# Patient Record
Sex: MALE | URBAN - METROPOLITAN AREA
[De-identification: names, ages, dates, MRNs, and addresses within clinical notes are randomized per-mention and may not be internally consistent; named-entity substitution may affect disease eponyms.]

---

## 2018-06-07 ENCOUNTER — HOSPITAL ENCOUNTER (OUTPATIENT)
Age: 63
Setting detail: SPECIMEN
Discharge: HOME | End: 2018-06-07
Payer: MEDICARE

## 2018-06-07 LAB
ABSOLUTE EOS #: 0.1 K/UL (ref 0–0.44)
ABSOLUTE IMMATURE GRANULOCYTE: <0.03 K/UL (ref 0–0.3)
ABSOLUTE LYMPH #: 2.58 K/UL (ref 1.1–3.7)
ABSOLUTE MONO #: 0.59 K/UL (ref 0.1–1.2)
ALBUMIN SERPL-MCNC: 4.2 G/DL (ref 3.5–5.2)
ALBUMIN/GLOBULIN RATIO: 1.5 (ref 1–2.5)
ALP BLD-CCNC: 77 U/L (ref 40–129)
ALT SERPL-CCNC: 13 U/L (ref 5–41)
ANION GAP SERPL CALCULATED.3IONS-SCNC: 16 MMOL/L (ref 9–17)
AST SERPL-CCNC: 17 U/L
BASOPHILS # BLD: 1 % (ref 0–2)
BASOPHILS ABSOLUTE: 0.05 K/UL (ref 0–0.2)
BILIRUB SERPL-MCNC: 0.64 MG/DL (ref 0.3–1.2)
BUN BLDV-MCNC: 11 MG/DL (ref 8–23)
BUN/CREAT BLD: NORMAL (ref 9–20)
CALCIUM SERPL-MCNC: 8.8 MG/DL (ref 8.6–10.4)
CHLORIDE BLD-SCNC: 105 MMOL/L (ref 98–107)
CHOLESTEROL, FASTING: 202 MG/DL
CHOLESTEROL/HDL RATIO: 4.3
CO2: 23 MMOL/L (ref 20–31)
CREAT SERPL-MCNC: 0.93 MG/DL (ref 0.7–1.2)
DIFFERENTIAL TYPE: NORMAL
EOSINOPHILS RELATIVE PERCENT: 1 % (ref 1–4)
GFR AFRICAN AMERICAN: >60 ML/MIN
GFR NON-AFRICAN AMERICAN: >60 ML/MIN
GFR SERPL CREATININE-BSD FRML MDRD: NORMAL ML/MIN/{1.73_M2}
GFR SERPL CREATININE-BSD FRML MDRD: NORMAL ML/MIN/{1.73_M2}
GLUCOSE FASTING: 87 MG/DL (ref 70–99)
HAV IGM SER IA-ACNC: NONREACTIVE
HCT VFR BLD CALC: 45.6 % (ref 40.7–50.3)
HDLC SERPL-MCNC: 47 MG/DL
HEMOGLOBIN: 15.1 G/DL (ref 13–17)
HEPATITIS B CORE IGM ANTIBODY: NONREACTIVE
HEPATITIS B SURFACE ANTIGEN: NONREACTIVE
HEPATITIS C ANTIBODY: NONREACTIVE
IMMATURE GRANULOCYTES: 0 %
LDL CHOLESTEROL: 120 MG/DL (ref 0–130)
LYMPHOCYTES # BLD: 30 % (ref 24–43)
MCH RBC QN AUTO: 32.7 PG (ref 25.2–33.5)
MCHC RBC AUTO-ENTMCNC: 33.1 G/DL (ref 28.4–34.8)
MCV RBC AUTO: 98.7 FL (ref 82.6–102.9)
MONOCYTES # BLD: 7 % (ref 3–12)
NRBC AUTOMATED: 0 PER 100 WBC
PDW BLD-RTO: 12.3 % (ref 11.8–14.4)
PLATELET # BLD: 221 K/UL (ref 138–453)
PLATELET ESTIMATE: NORMAL
PMV BLD AUTO: 10 FL (ref 8.1–13.5)
POTASSIUM SERPL-SCNC: 4.5 MMOL/L (ref 3.7–5.3)
PROSTATE SPECIFIC ANTIGEN: 1.04 UG/L
RBC # BLD: 4.62 M/UL (ref 4.21–5.77)
RBC # BLD: NORMAL 10*6/UL
SEG NEUTROPHILS: 61 % (ref 36–65)
SEGMENTED NEUTROPHILS ABSOLUTE COUNT: 5.24 K/UL (ref 1.5–8.1)
SODIUM BLD-SCNC: 144 MMOL/L (ref 135–144)
TOTAL PROTEIN: 7 G/DL (ref 6.4–8.3)
TRIGLYCERIDE, FASTING: 176 MG/DL
TSH SERPL DL<=0.05 MIU/L-ACNC: 6.12 MIU/L (ref 0.3–5)
VLDLC SERPL CALC-MCNC: ABNORMAL MG/DL (ref 1–30)
WBC # BLD: 8.6 K/UL (ref 3.5–11.3)
WBC # BLD: NORMAL 10*3/UL

## 2023-02-22 ENCOUNTER — APPOINTMENT (OUTPATIENT)
Dept: CT IMAGING | Age: 68
End: 2023-02-22
Payer: MEDICARE

## 2023-02-22 ENCOUNTER — HOSPITAL ENCOUNTER (INPATIENT)
Age: 68
LOS: 2 days | Discharge: HOME | End: 2023-02-24
Attending: EMERGENCY MEDICINE | Admitting: INTERNAL MEDICINE
Payer: MEDICARE

## 2023-02-22 ENCOUNTER — APPOINTMENT (OUTPATIENT)
Dept: GENERAL RADIOLOGY | Age: 68
End: 2023-02-22
Payer: MEDICARE

## 2023-02-22 DIAGNOSIS — R07.9 CHEST PAIN, UNSPECIFIED TYPE: Primary | ICD-10-CM

## 2023-02-22 DIAGNOSIS — R06.02 SHORTNESS OF BREATH: ICD-10-CM

## 2023-02-22 DIAGNOSIS — R09.02 HYPOXIA: ICD-10-CM

## 2023-02-22 PROBLEM — J44.1 COPD EXACERBATION (HCC): Status: ACTIVE | Noted: 2023-02-22

## 2023-02-22 LAB
ABSOLUTE EOS #: <0.03 K/UL (ref 0–0.44)
ABSOLUTE IMMATURE GRANULOCYTE: <0.03 K/UL (ref 0–0.3)
ABSOLUTE LYMPH #: 1.53 K/UL (ref 1.1–3.7)
ABSOLUTE MONO #: 0.41 K/UL (ref 0.1–1.2)
ALBUMIN SERPL-MCNC: 4.4 G/DL (ref 3.5–5.2)
ALBUMIN/GLOBULIN RATIO: 1.4 (ref 1–2.5)
ALP SERPL-CCNC: 88 U/L (ref 40–129)
ALT SERPL-CCNC: 10 U/L (ref 5–41)
ANION GAP SERPL CALCULATED.3IONS-SCNC: 15 MMOL/L (ref 9–17)
AST SERPL-CCNC: 15 U/L
BASOPHILS # BLD: 0 % (ref 0–2)
BASOPHILS ABSOLUTE: 0.03 K/UL (ref 0–0.2)
BILIRUB SERPL-MCNC: 0.9 MG/DL (ref 0.3–1.2)
BNP SERPL-MCNC: 330 PG/ML
BUN SERPL-MCNC: 11 MG/DL (ref 8–23)
CALCIUM SERPL-MCNC: 9.8 MG/DL (ref 8.6–10.4)
CHLORIDE SERPL-SCNC: 101 MMOL/L (ref 98–107)
CO2 SERPL-SCNC: 20 MMOL/L (ref 20–31)
CREAT SERPL-MCNC: 0.96 MG/DL (ref 0.7–1.2)
EOSINOPHILS RELATIVE PERCENT: 0 % (ref 1–4)
FLUAV AG SPEC QL: NEGATIVE
FLUBV AG SPEC QL: NEGATIVE
GFR SERPL CREATININE-BSD FRML MDRD: >60 ML/MIN/1.73M2
GLUCOSE SERPL-MCNC: 123 MG/DL (ref 70–99)
HCT VFR BLD AUTO: 43.5 % (ref 40.7–50.3)
HGB BLD-MCNC: 15.5 G/DL (ref 13–17)
IMMATURE GRANULOCYTES: 0 %
LYMPHOCYTES # BLD: 16 % (ref 24–43)
MCH RBC QN AUTO: 33.8 PG (ref 25.2–33.5)
MCHC RBC AUTO-ENTMCNC: 35.6 G/DL (ref 28.4–34.8)
MCV RBC AUTO: 94.8 FL (ref 82.6–102.9)
MONOCYTES # BLD: 4 % (ref 3–12)
NRBC AUTOMATED: 0 PER 100 WBC
PDW BLD-RTO: 12.4 % (ref 11.8–14.4)
PLATELET # BLD AUTO: 236 K/UL (ref 138–453)
PMV BLD AUTO: 9.1 FL (ref 8.1–13.5)
POTASSIUM SERPL-SCNC: 4.1 MMOL/L (ref 3.7–5.3)
PROT SERPL-MCNC: 7.5 G/DL (ref 6.4–8.3)
RBC # BLD: 4.59 M/UL (ref 4.21–5.77)
SARS-COV-2 RDRP RESP QL NAA+PROBE: NOT DETECTED
SEG NEUTROPHILS: 80 % (ref 36–65)
SEGMENTED NEUTROPHILS ABSOLUTE COUNT: 7.49 K/UL (ref 1.5–8.1)
SODIUM SERPL-SCNC: 136 MMOL/L (ref 135–144)
SPECIMEN DESCRIPTION: NORMAL
TROPONIN I SERPL DL<=0.01 NG/ML-MCNC: 11 NG/L (ref 0–22)
TROPONIN I SERPL DL<=0.01 NG/ML-MCNC: 12 NG/L (ref 0–22)
WBC # BLD AUTO: 9.5 K/UL (ref 3.5–11.3)

## 2023-02-22 PROCEDURE — 84484 ASSAY OF TROPONIN QUANT: CPT

## 2023-02-22 PROCEDURE — 85025 COMPLETE CBC W/AUTO DIFF WBC: CPT

## 2023-02-22 PROCEDURE — 6360000002 HC RX W HCPCS: Performed by: HEALTH CARE PROVIDER

## 2023-02-22 PROCEDURE — 99285 EMERGENCY DEPT VISIT HI MDM: CPT

## 2023-02-22 PROCEDURE — 71260 CT THORAX DX C+: CPT | Performed by: HEALTH CARE PROVIDER

## 2023-02-22 PROCEDURE — 80053 COMPREHEN METABOLIC PANEL: CPT

## 2023-02-22 PROCEDURE — 93005 ELECTROCARDIOGRAM TRACING: CPT | Performed by: HEALTH CARE PROVIDER

## 2023-02-22 PROCEDURE — 87804 INFLUENZA ASSAY W/OPTIC: CPT

## 2023-02-22 PROCEDURE — 87635 SARS-COV-2 COVID-19 AMP PRB: CPT

## 2023-02-22 PROCEDURE — 71046 X-RAY EXAM CHEST 2 VIEWS: CPT

## 2023-02-22 PROCEDURE — 1200000000 HC SEMI PRIVATE

## 2023-02-22 PROCEDURE — 83880 ASSAY OF NATRIURETIC PEPTIDE: CPT

## 2023-02-22 PROCEDURE — 6360000004 HC RX CONTRAST MEDICATION: Performed by: HEALTH CARE PROVIDER

## 2023-02-22 RX ORDER — ONDANSETRON 4 MG/1
4 TABLET, ORALLY DISINTEGRATING ORAL EVERY 8 HOURS PRN
Status: DISCONTINUED | OUTPATIENT
Start: 2023-02-22 | End: 2023-02-24 | Stop reason: HOSPADM

## 2023-02-22 RX ORDER — IPRATROPIUM BROMIDE AND ALBUTEROL SULFATE 2.5; .5 MG/3ML; MG/3ML
1 SOLUTION RESPIRATORY (INHALATION)
Status: DISCONTINUED | OUTPATIENT
Start: 2023-02-23 | End: 2023-02-23

## 2023-02-22 RX ORDER — SODIUM CHLORIDE 9 MG/ML
INJECTION, SOLUTION INTRAVENOUS CONTINUOUS
Status: DISCONTINUED | OUTPATIENT
Start: 2023-02-23 | End: 2023-02-24 | Stop reason: HOSPADM

## 2023-02-22 RX ORDER — ENOXAPARIN SODIUM 100 MG/ML
40 INJECTION SUBCUTANEOUS DAILY
Status: DISCONTINUED | OUTPATIENT
Start: 2023-02-23 | End: 2023-02-24 | Stop reason: HOSPADM

## 2023-02-22 RX ORDER — ACETAMINOPHEN 325 MG/1
650 TABLET ORAL EVERY 6 HOURS PRN
Status: DISCONTINUED | OUTPATIENT
Start: 2023-02-22 | End: 2023-02-24 | Stop reason: HOSPADM

## 2023-02-22 RX ORDER — POLYETHYLENE GLYCOL 3350 17 G/17G
17 POWDER, FOR SOLUTION ORAL DAILY PRN
Status: DISCONTINUED | OUTPATIENT
Start: 2023-02-22 | End: 2023-02-24 | Stop reason: HOSPADM

## 2023-02-22 RX ORDER — ALBUTEROL SULFATE 2.5 MG/3ML
2.5 SOLUTION RESPIRATORY (INHALATION)
Status: DISCONTINUED | OUTPATIENT
Start: 2023-02-22 | End: 2023-02-23

## 2023-02-22 RX ORDER — SODIUM CHLORIDE 0.9 % (FLUSH) 0.9 %
5-40 SYRINGE (ML) INJECTION EVERY 12 HOURS SCHEDULED
Status: DISCONTINUED | OUTPATIENT
Start: 2023-02-23 | End: 2023-02-24 | Stop reason: HOSPADM

## 2023-02-22 RX ORDER — METHYLPREDNISOLONE SODIUM SUCCINATE 125 MG/2ML
125 INJECTION, POWDER, LYOPHILIZED, FOR SOLUTION INTRAMUSCULAR; INTRAVENOUS ONCE
Status: COMPLETED | OUTPATIENT
Start: 2023-02-22 | End: 2023-02-22

## 2023-02-22 RX ORDER — ACETAMINOPHEN 650 MG/1
650 SUPPOSITORY RECTAL EVERY 6 HOURS PRN
Status: DISCONTINUED | OUTPATIENT
Start: 2023-02-22 | End: 2023-02-24 | Stop reason: HOSPADM

## 2023-02-22 RX ORDER — SODIUM CHLORIDE 9 MG/ML
INJECTION, SOLUTION INTRAVENOUS PRN
Status: DISCONTINUED | OUTPATIENT
Start: 2023-02-22 | End: 2023-02-24 | Stop reason: HOSPADM

## 2023-02-22 RX ORDER — ONDANSETRON 2 MG/ML
4 INJECTION INTRAMUSCULAR; INTRAVENOUS EVERY 6 HOURS PRN
Status: DISCONTINUED | OUTPATIENT
Start: 2023-02-22 | End: 2023-02-24 | Stop reason: HOSPADM

## 2023-02-22 RX ORDER — PREDNISONE 20 MG/1
40 TABLET ORAL DAILY
Status: DISCONTINUED | OUTPATIENT
Start: 2023-02-25 | End: 2023-02-23

## 2023-02-22 RX ORDER — METHYLPREDNISOLONE SODIUM SUCCINATE 40 MG/ML
40 INJECTION, POWDER, LYOPHILIZED, FOR SOLUTION INTRAMUSCULAR; INTRAVENOUS EVERY 6 HOURS
Status: DISCONTINUED | OUTPATIENT
Start: 2023-02-22 | End: 2023-02-23

## 2023-02-22 RX ORDER — SODIUM CHLORIDE 0.9 % (FLUSH) 0.9 %
5-40 SYRINGE (ML) INJECTION PRN
Status: DISCONTINUED | OUTPATIENT
Start: 2023-02-22 | End: 2023-02-24 | Stop reason: HOSPADM

## 2023-02-22 RX ORDER — AZITHROMYCIN 250 MG/1
500 TABLET, FILM COATED ORAL DAILY
Status: DISCONTINUED | OUTPATIENT
Start: 2023-02-23 | End: 2023-02-24 | Stop reason: HOSPADM

## 2023-02-22 RX ADMIN — METHYLPREDNISOLONE SODIUM SUCCINATE 125 MG: 125 INJECTION, POWDER, FOR SOLUTION INTRAMUSCULAR; INTRAVENOUS at 22:08

## 2023-02-22 RX ADMIN — IOPAMIDOL 75 ML: 755 INJECTION, SOLUTION INTRAVENOUS at 22:56

## 2023-02-22 ASSESSMENT — PAIN SCALES - GENERAL: PAINLEVEL_OUTOF10: 6

## 2023-02-22 ASSESSMENT — PAIN - FUNCTIONAL ASSESSMENT: PAIN_FUNCTIONAL_ASSESSMENT: 0-10

## 2023-02-22 ASSESSMENT — PAIN DESCRIPTION - LOCATION: LOCATION: CHEST;ABDOMEN

## 2023-02-22 NOTE — ED NOTES
Pt oxygen saturation 89% on room air.  Placed on 2L nasal cannula, increased to 93%     Sydney Walker RN  02/22/23 2139

## 2023-02-22 NOTE — ED NOTES
Pt to ED for chest pain and SOB. Pt reports intermittent chest pain x1 day along with nausea, vomiting, and chills. States same symptoms on and off for the past few months. Denies any medical problems, does not take medications daily. Patient alert and oriented x4, talking in complete sentences. Respirations even and unlabored. Patient placed on continuous cardiac monitoring, BP cuff, and pulse ox. EKG obtained, blood work obtained.  Call light in reach, all needs met at this time     Khadijah Samano RN  02/22/23 2052

## 2023-02-22 NOTE — ED PROVIDER NOTES
[facility]MED [facility]  Emergency Department Encounter  Emergency Medicine Resident     Pt Ihor Gosselin  MRN: [id number]  Ktgfevin 1955  Date of evaluation: 2/22/23  PCP:  No primary care provider on file. Note Started: 6:32 PM EST      CHIEF COMPLAINT       Chief Complaint   Patient presents with    Chest Pain    Nausea & Vomiting    Chills       HISTORY OF PRESENT ILLNESS  (Location/Symptom, Timing/Onset, Context/Setting, Quality, Duration, Modifying Factors, Severity.)      Lissa To is a 78 y.o. male who presents with chest pain, shortness of breath, generalized malaise and chills. Symptoms started yesterday. Worsening today. Patient's son brought him to the lobby and patient had to sit down due to feeling fatigued. JOSE G ZIEGLER was called overhead by information . On assessment, patient was awake, alert and talking. Complaining of chest pain and shortness of breath. Patient was brought to the emergency department for further assessment. Patient states that he has been feeling unwell for the last couple days. Began to feel short of breath with a cough, as well as chest pain. Has had subjective chills, but no fevers. Single episode of nausea and vomiting. Patient is a 50+ pack year smoker. Nuys other medical history, but states he has not seen a physician in more than a decade.     PAST MEDICAL / SURGICAL / SOCIAL / FAMILY HISTORY     Denies past medical or surgical history    Social History     Socioeconomic History    Marital status:      Spouse name: Not on file    Number of children: Not on file    Years of education: Not on file    Highest education level: Not on file   Occupational History    Not on file   Tobacco Use    Smoking status: Every Day     Packs/day: 1.00     Years: 25.00     Pack years: 25.00     Types: Cigarettes    Smokeless tobacco: Not on file   Substance and Sexual Activity    Alcohol use: Not on file    Drug use: Yes     Types: Marijuana Laveda Winkelman)    Sexual activity: Not on file   Other Topics Concern    Not on file   Social History Narrative    Not on file     Social Determinants of Health     Financial Resource Strain: Not on file   Food Insecurity: Not on file   Transportation Needs: Not on file   Physical Activity: Not on file   Stress: Not on file   Social Connections: Not on file   Intimate Partner Violence: Not on file   Housing Stability: Not on file       History reviewed. No pertinent family history. Allergies:  Patient has no known allergies. Home Medications:  Prior to Admission medications    Medication Sig Start Date End Date Taking? Authorizing Provider   aspirin 81 MG EC tablet Take 81 mg by mouth daily   Yes Historical Provider, MD         REVIEW OF SYSTEMS       Review of Systems   Constitutional:  Positive for chills and fatigue. Negative for diaphoresis and fever. HENT:  Negative for trouble swallowing. Eyes:  Negative for visual disturbance. Respiratory:  Positive for shortness of breath. Cardiovascular:  Positive for chest pain. Gastrointestinal:  Positive for nausea and vomiting. Negative for abdominal pain and diarrhea. Genitourinary:  Negative for decreased urine volume. Musculoskeletal:  Negative for neck pain and neck stiffness. Allergic/Immunologic: Negative for immunocompromised state. Neurological:  Negative for dizziness, syncope, light-headedness and headaches. Hematological:  Does not bruise/bleed easily. PHYSICAL EXAM      INITIAL VITALS:   BP (!) 145/78   Pulse 96   Temp 98.4 °F (36.9 °C) (Oral)   Resp 20   Ht 6' (1.829 m)   Wt 190 lb (86.2 kg)   SpO2 94%   BMI 25.77 kg/m²     Physical Exam  Vitals reviewed. Constitutional:       General: He is not in acute distress. Appearance: Normal appearance. He is ill-appearing. HENT:      Head: Normocephalic and atraumatic.       Right Ear: Tympanic membrane, ear canal and external ear normal.      Left Ear: Tympanic membrane, ear canal and external ear normal.      Nose: Nose normal. No rhinorrhea. Mouth/Throat:      Mouth: Mucous membranes are moist.      Pharynx: Oropharynx is clear. Eyes:      Conjunctiva/sclera: Conjunctivae normal.      Pupils: Pupils are equal, round, and reactive to light. Cardiovascular:      Rate and Rhythm: Normal rate and regular rhythm. Pulses: Normal pulses. Heart sounds: Normal heart sounds. Pulmonary:      Effort: Pulmonary effort is normal.      Breath sounds: Wheezing present. Comments: Scant wheezes bilaterally  Abdominal:      General: There is no distension. Palpations: Abdomen is soft. Tenderness: There is no abdominal tenderness. Musculoskeletal:      Cervical back: Normal range of motion and neck supple. Right lower leg: No edema. Left lower leg: No edema. Skin:     General: Skin is warm and dry. Capillary Refill: Capillary refill takes less than 2 seconds. Neurological:      General: No focal deficit present. Mental Status: He is alert and oriented to person, place, and time. Psychiatric:         Mood and Affect: Mood normal.         Behavior: Behavior normal.         DDX/DIAGNOSTIC RESULTS / EMERGENCY DEPARTMENT COURSE / MDM     Medical Decision Making  69-year-old male with chest pain, shortness of breath and generalized malaise. Given risk factors and dyspnea, will initiate cardiac work-up. We will also obtain COVID and flu swabs. Patient did desaturate to the high 80s on room air, so was placed on supplemental oxygen. Does report some improvement in breathing. Given smoking history and dyspnea with wheezing and hypoxia, patient may also have undiagnosed COPD. Does also not meet PERC criteria. Will consider D-dimer versus CTA of the chest based on additional exam findings. Amount and/or Complexity of Data Reviewed  Labs: ordered. Radiology: ordered. ECG/medicine tests: ordered.     Risk  Prescription drug management. Decision regarding hospitalization. EKG    Rhythm: normal sinus   Rate: normal/97  Axis: Right 147 degrees  Ectopy: Sinus arrhythmia  Conduction: normal  ST Segments: no acute change  T Waves: no acute change  Q Waves: none    Clinical Impression: Abnormal EKG  All EKG's are interpreted by the Emergency Department Physician who either signs or Co-signs this chart in the absence of a cardiologist.    EMERGENCY DEPARTMENT COURSE:      ED Course as of 02/23/23 0038   Wed Feb 22, 2023 2012 Patient desaturated into the 80s on room air. Now saturating 94% on 2 L. Heart score is 5. Will admit for further work-up. [GG]      ED Course User Index  [GG] Anai Goldstein MD       PROCEDURES:  None    CONSULTS:  IP CONSULT TO HOSPITALIST    CRITICAL CARE:  There was significant risk of life threatening deterioration of patient's condition requiring my direct management. Critical care time 33 minutes, excluding any documented procedures. FINAL IMPRESSION      1. Chest pain, unspecified type    2. Shortness of breath    3. Hypoxia          DISPOSITION / PLAN     DISPOSITION Admitted 02/22/2023 11:09:53 PM      PATIENT REFERRED TO:  No follow-up provider specified.     DISCHARGE MEDICATIONS:  Current Discharge Medication List          Anai Goldstein MD  Emergency Medicine Resident    (Please note that portions of thisnote were completed with a voice recognition program.  Efforts were made to edit the dictations but occasionally words are mis-transcribed.)       Anai Goldstein MD  Resident  02/23/23 8453

## 2023-02-23 ENCOUNTER — APPOINTMENT (OUTPATIENT)
Dept: NUCLEAR MEDICINE | Age: 68
End: 2023-02-23
Payer: MEDICARE

## 2023-02-23 ENCOUNTER — APPOINTMENT (OUTPATIENT)
Dept: GENERAL RADIOLOGY | Age: 68
End: 2023-02-23
Payer: MEDICARE

## 2023-02-23 ENCOUNTER — APPOINTMENT (OUTPATIENT)
Dept: NON INVASIVE DIAGNOSTICS | Age: 68
End: 2023-02-23
Payer: MEDICARE

## 2023-02-23 PROBLEM — R07.9 CHEST PAIN IN ADULT: Status: ACTIVE | Noted: 2023-02-23

## 2023-02-23 PROBLEM — Z72.0 TOBACCO ABUSE: Status: ACTIVE | Noted: 2023-02-23

## 2023-02-23 PROBLEM — R11.0 NAUSEA: Status: ACTIVE | Noted: 2023-02-23

## 2023-02-23 PROBLEM — R10.13 EPIGASTRIC ABDOMINAL PAIN: Status: ACTIVE | Noted: 2023-02-23

## 2023-02-23 PROBLEM — R07.9 CHEST PAIN: Status: ACTIVE | Noted: 2023-02-23

## 2023-02-23 PROBLEM — R19.7 DIARRHEA: Status: ACTIVE | Noted: 2023-02-23

## 2023-02-23 LAB
ANION GAP SERPL CALCULATED.3IONS-SCNC: 12 MMOL/L (ref 9–17)
BILIRUBIN URINE: NEGATIVE
BUN SERPL-MCNC: 13 MG/DL (ref 8–23)
CALCIUM SERPL-MCNC: 9.1 MG/DL (ref 8.6–10.4)
CASTS UA: NORMAL /LPF (ref 0–8)
CEA SERPL-MCNC: 2.9 NG/ML
CHLORIDE SERPL-SCNC: 104 MMOL/L (ref 98–107)
CO2 SERPL-SCNC: 23 MMOL/L (ref 20–31)
COLOR: YELLOW
CREAT SERPL-MCNC: 1.02 MG/DL (ref 0.7–1.2)
CRP SERPL HS-MCNC: 6.9 MG/L (ref 0–5)
EKG ATRIAL RATE: 97 BPM
EKG P-R INTERVAL: 176 MS
EKG Q-T INTERVAL: 338 MS
EKG QRS DURATION: 80 MS
EKG QTC CALCULATION (BAZETT): 429 MS
EKG R AXIS: 147 DEGREES
EKG T AXIS: 119 DEGREES
EKG VENTRICULAR RATE: 97 BPM
EPITHELIAL CELLS UA: NORMAL /HPF (ref 0–5)
ERYTHROCYTE [SEDIMENTATION RATE] IN BLOOD BY WESTERGREN METHOD: 16 MM/HR (ref 0–20)
GFR SERPL CREATININE-BSD FRML MDRD: >60 ML/MIN/1.73M2
GLUCOSE SERPL-MCNC: 144 MG/DL (ref 70–99)
GLUCOSE UR STRIP.AUTO-MCNC: NEGATIVE MG/DL
KETONES UR STRIP.AUTO-MCNC: ABNORMAL MG/DL
LDLC SERPL-MCNC: 164 U/L (ref 135–225)
LEUKOCYTE ESTERASE UR QL STRIP.AUTO: NEGATIVE
LV EF: 66 %
LVEF MODALITY: NORMAL
NITRITE UR QL STRIP.AUTO: NEGATIVE
POTASSIUM SERPL-SCNC: 4.1 MMOL/L (ref 3.7–5.3)
PROT UR STRIP.AUTO-MCNC: 5.5 MG/DL (ref 5–8)
PROT UR STRIP.AUTO-MCNC: ABNORMAL MG/DL
RBC CLUMPS #/AREA URNS AUTO: NORMAL /HPF (ref 0–4)
REASON FOR REJECTION: NORMAL
SODIUM SERPL-SCNC: 139 MMOL/L (ref 135–144)
SPECIFIC GRAVITY UA: 1.07 (ref 1–1.03)
TSH SERPL-ACNC: 1.27 UIU/ML (ref 0.3–5)
TURBIDITY: CLEAR
URINE HGB: ABNORMAL
UROBILINOGEN, URINE: NORMAL
WBC UA: NORMAL /HPF (ref 0–5)
ZZ NTE CLEAN UP: ORDERED TEST: NORMAL
ZZ NTE WITH NAME CLEAN UP: SPECIMEN SOURCE: NORMAL

## 2023-02-23 PROCEDURE — 1200000000 HC SEMI PRIVATE

## 2023-02-23 PROCEDURE — 36415 COLL VENOUS BLD VENIPUNCTURE: CPT

## 2023-02-23 PROCEDURE — 94640 AIRWAY INHALATION TREATMENT: CPT

## 2023-02-23 PROCEDURE — 97161 PT EVAL LOW COMPLEX 20 MIN: CPT

## 2023-02-23 PROCEDURE — 97530 THERAPEUTIC ACTIVITIES: CPT

## 2023-02-23 PROCEDURE — 6370000000 HC RX 637 (ALT 250 FOR IP): Performed by: CLINICAL NURSE SPECIALIST

## 2023-02-23 PROCEDURE — 93017 CV STRESS TEST TRACING ONLY: CPT

## 2023-02-23 PROCEDURE — 6370000000 HC RX 637 (ALT 250 FOR IP): Performed by: NURSE PRACTITIONER

## 2023-02-23 PROCEDURE — 83615 LACTATE (LD) (LDH) ENZYME: CPT

## 2023-02-23 PROCEDURE — 93010 ELECTROCARDIOGRAM REPORT: CPT | Performed by: INTERNAL MEDICINE

## 2023-02-23 PROCEDURE — 3430000000 HC RX DIAGNOSTIC RADIOPHARMACEUTICAL: Performed by: INTERNAL MEDICINE

## 2023-02-23 PROCEDURE — 2T015 HOSPITALIST 2ND TOUCH: CPT | Performed by: INTERNAL MEDICINE

## 2023-02-23 PROCEDURE — A9500 TC99M SESTAMIBI: HCPCS | Performed by: INTERNAL MEDICINE

## 2023-02-23 PROCEDURE — 2580000003 HC RX 258: Performed by: CLINICAL NURSE SPECIALIST

## 2023-02-23 PROCEDURE — 82378 CARCINOEMBRYONIC ANTIGEN: CPT

## 2023-02-23 PROCEDURE — 2700000000 HC OXYGEN THERAPY PER DAY

## 2023-02-23 PROCEDURE — 86140 C-REACTIVE PROTEIN: CPT

## 2023-02-23 PROCEDURE — 71045 X-RAY EXAM CHEST 1 VIEW: CPT

## 2023-02-23 PROCEDURE — 85652 RBC SED RATE AUTOMATED: CPT

## 2023-02-23 PROCEDURE — 81001 URINALYSIS AUTO W/SCOPE: CPT

## 2023-02-23 PROCEDURE — 51798 US URINE CAPACITY MEASURE: CPT

## 2023-02-23 PROCEDURE — 6370000000 HC RX 637 (ALT 250 FOR IP): Performed by: INTERNAL MEDICINE

## 2023-02-23 PROCEDURE — 78452 HT MUSCLE IMAGE SPECT MULT: CPT

## 2023-02-23 PROCEDURE — 6360000002 HC RX W HCPCS: Performed by: CLINICAL NURSE SPECIALIST

## 2023-02-23 PROCEDURE — 80048 BASIC METABOLIC PNL TOTAL CA: CPT

## 2023-02-23 PROCEDURE — 99223 1ST HOSP IP/OBS HIGH 75: CPT | Performed by: INTERNAL MEDICINE

## 2023-02-23 PROCEDURE — 84443 ASSAY THYROID STIM HORMONE: CPT

## 2023-02-23 PROCEDURE — 2580000003 HC RX 258: Performed by: INTERNAL MEDICINE

## 2023-02-23 RX ORDER — METOPROLOL TARTRATE 5 MG/5ML
5 INJECTION INTRAVENOUS EVERY 5 MIN PRN
Status: DISCONTINUED | OUTPATIENT
Start: 2023-02-23 | End: 2023-02-23 | Stop reason: ALTCHOICE

## 2023-02-23 RX ORDER — SODIUM CHLORIDE 0.9 % (FLUSH) 0.9 %
10 SYRINGE (ML) INJECTION PRN
Status: DISCONTINUED | OUTPATIENT
Start: 2023-02-23 | End: 2023-02-24 | Stop reason: HOSPADM

## 2023-02-23 RX ORDER — CHOLECALCIFEROL (VITAMIN D3) 125 MCG
5 CAPSULE ORAL NIGHTLY PRN
Status: DISCONTINUED | OUTPATIENT
Start: 2023-02-23 | End: 2023-02-24 | Stop reason: HOSPADM

## 2023-02-23 RX ORDER — PREDNISONE 20 MG/1
40 TABLET ORAL DAILY
Status: DISCONTINUED | OUTPATIENT
Start: 2023-02-23 | End: 2023-02-24 | Stop reason: HOSPADM

## 2023-02-23 RX ORDER — SODIUM CHLORIDE 9 MG/ML
500 INJECTION, SOLUTION INTRAVENOUS CONTINUOUS PRN
Status: DISCONTINUED | OUTPATIENT
Start: 2023-02-23 | End: 2023-02-23 | Stop reason: ALTCHOICE

## 2023-02-23 RX ORDER — IPRATROPIUM BROMIDE AND ALBUTEROL SULFATE 2.5; .5 MG/3ML; MG/3ML
1 SOLUTION RESPIRATORY (INHALATION) EVERY 4 HOURS PRN
Status: DISCONTINUED | OUTPATIENT
Start: 2023-02-23 | End: 2023-02-24 | Stop reason: HOSPADM

## 2023-02-23 RX ORDER — AMINOPHYLLINE DIHYDRATE 25 MG/ML
50 INJECTION, SOLUTION INTRAVENOUS PRN
Status: DISCONTINUED | OUTPATIENT
Start: 2023-02-23 | End: 2023-02-23 | Stop reason: ALTCHOICE

## 2023-02-23 RX ORDER — NITROGLYCERIN 0.4 MG/1
0.4 TABLET SUBLINGUAL EVERY 5 MIN PRN
Status: ACTIVE | OUTPATIENT
Start: 2023-02-23 | End: 2023-02-23

## 2023-02-23 RX ORDER — TECHNETIUM TC-99M SESTAMIBI 1 MG/10ML
40 INJECTION INTRAVENOUS
Status: COMPLETED | OUTPATIENT
Start: 2023-02-23 | End: 2023-02-23

## 2023-02-23 RX ORDER — FAMOTIDINE 20 MG/1
20 TABLET, FILM COATED ORAL 2 TIMES DAILY
Status: DISCONTINUED | OUTPATIENT
Start: 2023-02-23 | End: 2023-02-24 | Stop reason: HOSPADM

## 2023-02-23 RX ORDER — ALBUTEROL SULFATE 90 UG/1
2 AEROSOL, METERED RESPIRATORY (INHALATION) PRN
Status: DISCONTINUED | OUTPATIENT
Start: 2023-02-23 | End: 2023-02-23 | Stop reason: ALTCHOICE

## 2023-02-23 RX ORDER — ATROPINE SULFATE 0.1 MG/ML
0.5 INJECTION INTRAVENOUS EVERY 5 MIN PRN
Status: DISCONTINUED | OUTPATIENT
Start: 2023-02-23 | End: 2023-02-23 | Stop reason: ALTCHOICE

## 2023-02-23 RX ORDER — TECHNETIUM TC-99M SESTAMIBI 1 MG/10ML
15.6 INJECTION INTRAVENOUS
Status: COMPLETED | OUTPATIENT
Start: 2023-02-23 | End: 2023-02-23

## 2023-02-23 RX ORDER — SODIUM CHLORIDE 0.9 % (FLUSH) 0.9 %
5-40 SYRINGE (ML) INJECTION PRN
Status: DISCONTINUED | OUTPATIENT
Start: 2023-02-23 | End: 2023-02-23 | Stop reason: ALTCHOICE

## 2023-02-23 RX ORDER — ASPIRIN 81 MG/1
81 TABLET ORAL DAILY
COMMUNITY

## 2023-02-23 RX ADMIN — SODIUM CHLORIDE: 9 INJECTION, SOLUTION INTRAVENOUS at 00:19

## 2023-02-23 RX ADMIN — SODIUM CHLORIDE: 9 INJECTION, SOLUTION INTRAVENOUS at 11:16

## 2023-02-23 RX ADMIN — ENOXAPARIN SODIUM 40 MG: 100 INJECTION SUBCUTANEOUS at 11:14

## 2023-02-23 RX ADMIN — Medication 5 MG: at 20:48

## 2023-02-23 RX ADMIN — METHYLPREDNISOLONE SODIUM SUCCINATE 40 MG: 40 INJECTION, POWDER, FOR SOLUTION INTRAMUSCULAR; INTRAVENOUS at 04:01

## 2023-02-23 RX ADMIN — FAMOTIDINE 20 MG: 20 TABLET, FILM COATED ORAL at 20:31

## 2023-02-23 RX ADMIN — IPRATROPIUM BROMIDE AND ALBUTEROL SULFATE 1 AMPULE: 2.5; .5 SOLUTION RESPIRATORY (INHALATION) at 07:47

## 2023-02-23 RX ADMIN — AZITHROMYCIN 500 MG: 250 TABLET, FILM COATED ORAL at 11:13

## 2023-02-23 RX ADMIN — FAMOTIDINE 20 MG: 20 TABLET, FILM COATED ORAL at 11:14

## 2023-02-23 RX ADMIN — TETRAKIS(2-METHOXYISOBUTYLISOCYANIDE)COPPER(I) TETRAFLUOROBORATE 15.6 MILLICURIE: 1 INJECTION, POWDER, LYOPHILIZED, FOR SOLUTION INTRAVENOUS at 07:45

## 2023-02-23 RX ADMIN — TETRAKIS(2-METHOXYISOBUTYLISOCYANIDE)COPPER(I) TETRAFLUOROBORATE 40 MILLICURIE: 1 INJECTION, POWDER, LYOPHILIZED, FOR SOLUTION INTRAVENOUS at 09:45

## 2023-02-23 RX ADMIN — SODIUM CHLORIDE, PRESERVATIVE FREE 10 ML: 5 INJECTION INTRAVENOUS at 09:45

## 2023-02-23 RX ADMIN — PREDNISONE 40 MG: 20 TABLET ORAL at 11:14

## 2023-02-23 RX ADMIN — SODIUM CHLORIDE, PRESERVATIVE FREE 10 ML: 5 INJECTION INTRAVENOUS at 07:45

## 2023-02-23 RX ADMIN — SODIUM CHLORIDE, PRESERVATIVE FREE 5 ML: 5 INJECTION INTRAVENOUS at 11:15

## 2023-02-23 ASSESSMENT — ENCOUNTER SYMPTOMS
NAUSEA: 1
TROUBLE SWALLOWING: 0
DIARRHEA: 0
ABDOMINAL PAIN: 0
VOMITING: 1
SHORTNESS OF BREATH: 1

## 2023-02-23 ASSESSMENT — PAIN - FUNCTIONAL ASSESSMENT
PAIN_FUNCTIONAL_ASSESSMENT: ACTIVITIES ARE NOT PREVENTED

## 2023-02-23 ASSESSMENT — PAIN DESCRIPTION - ORIENTATION
ORIENTATION: UPPER
ORIENTATION: MID;UPPER
ORIENTATION: LOWER;MID

## 2023-02-23 ASSESSMENT — PAIN DESCRIPTION - FREQUENCY
FREQUENCY: INTERMITTENT

## 2023-02-23 ASSESSMENT — PAIN DESCRIPTION - LOCATION
LOCATION: ABDOMEN;CHEST
LOCATION: ABDOMEN
LOCATION: OTHER (COMMENT)

## 2023-02-23 ASSESSMENT — PAIN DESCRIPTION - ONSET
ONSET: ON-GOING

## 2023-02-23 ASSESSMENT — PAIN SCALES - GENERAL
PAINLEVEL_OUTOF10: 2
PAINLEVEL_OUTOF10: 6
PAINLEVEL_OUTOF10: 3
PAINLEVEL_OUTOF10: 5

## 2023-02-23 ASSESSMENT — PAIN DESCRIPTION - PAIN TYPE
TYPE: ACUTE PAIN

## 2023-02-23 ASSESSMENT — PAIN DESCRIPTION - DESCRIPTORS
DESCRIPTORS: SQUEEZING
DESCRIPTORS: TIGHTNESS
DESCRIPTORS: SQUEEZING;TIGHTNESS

## 2023-02-23 NOTE — PLAN OF CARE
Problem: Pain  Goal: Verbalizes/displays adequate comfort level or baseline comfort level  Outcome: Progressing     Problem: ABCDS Injury Assessment  Goal: Absence of physical injury  Outcome: Progressing     Problem: Nutrition Deficit:  Goal: Optimize nutritional status  2/23/2023 1728 by Jenn Craven RN  Outcome: Progressing  2/23/2023 1511 by Maria C Bush RD  Flowsheets (Taken 2/23/2023 1511)  Nutrient intake appropriate for improving, restoring, or maintaining nutritional needs:   Assess nutritional status and recommend course of action   Recommend appropriate diets, oral nutritional supplements, and vitamin/mineral supplements   Monitor oral intake, labs, and treatment plans

## 2023-02-23 NOTE — ED NOTES
Report given to [facility], RN.  All questions answered     Sydney Walker RN  02/22/23 1910

## 2023-02-23 NOTE — PROGRESS NOTES
Physical Therapy        Physical Therapy Cancel Note      DATE: 2023    NAME: Senait Nichols  MRN: [id number]   : 1955      Patient not seen this date for Physical Therapy due to:    Testing: pt off the floor for stress test. PT will check back as time allows.       Electronically signed by Bonny Jacobson PT on 2023 at 1:46 PM

## 2023-02-23 NOTE — ED NOTES
Pt. Resting on stretcher,RR even and non-labored  Pt. Updated on POC.   Will continue to monitor      Daniel Hurtado RN  02/22/23 7610

## 2023-02-23 NOTE — PROGRESS NOTES
Comprehensive Nutrition Assessment    Type and Reason for Visit:  RD Nutrition Re-Screen/LOS    Nutrition Recommendations/Plan:   Continue diet as ordered. Encourage PO intakes. Continue to monitor weights, intakes, labs, and follow up. Malnutrition Assessment:  Malnutrition Status:  No malnutrition (02/23/23 1509)    Context:  Acute Illness     Findings of the 6 clinical characteristics of malnutrition:  Energy Intake:  50% or less of estimated energy requirements for 5 or more days  Weight Loss:  No significant weight loss (approximately 5% weight loss in three months)     Body Fat Loss:  No significant body fat loss     Muscle Mass Loss:  No significant muscle mass loss    Fluid Accumulation:  No significant fluid accumulation     Strength:  Not Performed    Nutrition Assessment:    77 y/o male, admitted related to COPD exacerbation, chest, pain, nausea/vomiting, abdominal pain. Patient NPO this morning to stress test, diet advanced to regular. Patient reported that he received a meal tray after test, consumed little amount due to lack of appetite, but did consume the sherbert. Observed patient meal preferences being obtained prior to visit. Patient also stated his son is bringing him in a BLT in approx 30 minutes. Patient stated he has had a poor appetite since Tuesday, and did not consume anything PO yesterday, but can tell his appetite is coming back and that he is starting to feel hungry. Patient reported a 5 to 10 lb weight loss this winter, but did not seem concerned. Stated he is active during the day, walking approximately 2000 steps outdoors every day. No BM documented, +BS. Recommend to cont with current plan of care. Nutrition Related Findings:    Meds: Zithromax, prednisone; Labs reviewed Wound Type: None       Current Nutrition Intake & Therapies:    Average Meal Intake: Unable to assess  Average Supplements Intake: None Ordered  ADULT DIET;  Regular    Anthropometric Measures:  Height: 6' (182.9 cm)  Ideal Body Weight (IBW): 178 lbs (81 kg)    Admission Body Weight: 190 lb (86.2 kg)  Current Body Weight: 190 lb (86.2 kg),   IBW. Weight Source: Bed Scale  Current BMI (kg/m2): 25.8  Usual Body Weight: 200 lb (90.7 kg)  % Weight Change (Calculated): -5  Weight Adjustment For: No Adjustment  BMI Categories: Overweight (BMI 25.0-29.9)    Estimated Daily Nutrient Needs:  Energy Requirements Based On: Kcal/kg  Weight Used for Energy Requirements: Current  Energy (kcal/day): 1700-2100kcal/day  Weight Used for Protein Requirements: Current  Protein (g/day): 110gmsPRO/day  Fluid (ml/day): Per MD    Nutrition Diagnosis:   Predicted inadequate energy intake related to impaired respiratory function as evidenced by poor intake prior to admission    Nutrition Interventions:   Food and/or Nutrient Delivery: Continue Current Diet  Nutrition Education/Counseling: No recommendation at this time  Coordination of Nutrition Care: Continue to monitor while inpatient  Plan of Care discussed with: Patient    Goals:  Previous Goal Met:  (Goal set)  Goals: Meet at least 75% of estimated needs, within 7 days, PO intake 50% or greater       Nutrition Monitoring and Evaluation:   Behavioral-Environmental Outcomes: None Identified  Food/Nutrient Intake Outcomes: Food and Nutrient Intake  Physical Signs/Symptoms Outcomes: Biochemical Data, Nutrition Focused Physical Findings, Weight, Fluid Status or Edema, GI Status    Discharge Planning:    Too soon to determine     Gena Alfonso RD  Contact: [phone]

## 2023-02-23 NOTE — CARE COORDINATION
Case Management Assessment  Initial Evaluation    Date/Time of Evaluation: 2/23/2023 4:24 PM  Assessment Completed by: Tiffany Brown RN    If patient is discharged prior to next notation, then this note serves as note for discharge by case management. Patient Name: Aldo Shook                   YOB: 1955  Diagnosis: Shortness of breath [R06.02]  Hypoxia [R09.02]  COPD exacerbation ([facility] [address] [J44.1]  Chest pain, unspecified type [R07.9]  Chest pain in adult [R07.9]                   Date / Time: 2/22/2023  6:28 PM    Patient Admission Status: Inpatient   Readmission Risk (Low < 19, Mod (19-27), High > 27): Readmission Risk Score: 5.4    Current PCP: No primary care provider on file. PCP verified by CM? (P)  (No PCP-dtr working on finding one for patient)    Chart Reviewed: Yes      History Provided by: (P) Patient  Patient Orientation: (P) Alert and Oriented, Person, Place, Situation    Patient Cognition: (P) Alert    Hospitalization in the last 30 days (Readmission):  No    If yes, Readmission Assessment in CM Navigator will be completed.     Advance Directives:      Code Status: Full Code   Patient's Primary Decision Maker is: (P) Legal Next of Kin      Discharge Planning:    Patient lives with: (P) Alone Type of Home: (P) Apartment  Primary Care Giver: (P) Self  Patient Support Systems include: (P) Children, Family Members, Friends/Neighbors   Current Financial resources: (P) Medicare  Current community resources:    Current services prior to admission: (P) None            Current DME:              Type of Home Care services:  (P) None    ADLS  Prior functional level: (P) Independent in ADLs/IADLs  Current functional level: (P) Independent in ADLs/IADLs    PT AM-PAC: 24 /24  OT AM-PAC:   /24    Family can provide assistance at DC: (P) Yes  Would you like Case Management to discuss the discharge plan with any other family members/significant others, and if so, who? (P) No  Plans to Return to Present Housing: (P) Yes  Other Identified Issues/Barriers to RETURNING to current housing: na  Potential Assistance needed at discharge: (P) N/A            Potential DME:    Patient expects to discharge to: (P) [facility] for transportation at discharge: (P) Family    Financial    Payor: Natty Dony / Plan: MEDICARE PART A AND B / Product Type: *No Product type* /     Does insurance require precert for SNF: No    Potential assistance Purchasing Medications:    Meds-to-Beds request: Yes      Howie  [address]  [address] [place] [address]  Phone: [phone] Fax: [phone]      Notes:    Factors facilitating achievement of predicted outcomes: Family support, Motivated, Cooperative, Pleasant, and Sense of humor    Barriers to discharge: Decreased endurance    Additional Case Management Notes: Spoke with patient at bedside, role explained. Plan to return home independently. Has transportation. Denies further needs. Address, telephone numbers, ER contacts and insurance reviewed. The Plan for Transition of Care is related to the following treatment goals of Shortness of breath [R06.02]  Hypoxia [R09.02]  COPD exacerbation (HCC) [J44.1]  Chest pain, unspecified type [R07.9]  Chest pain in adult [U82.7]    IF APPLICABLE: The Patient and/or patient representative Abhishek Ortiz and his family were provided with a choice of provider and agrees with the discharge plan. Freedom of choice list with basic dialogue that supports the patient's individualized plan of care/goals and shares the quality data associated with the providers was provided to: (P) Patient   Patient Representative Name:       The Patient and/or Patient Representative Agree with the Discharge Plan?       Mikal Hu RN  Case Management Department  Ph: [phone]

## 2023-02-23 NOTE — PROGRESS NOTES
Occupational [employer] [employer]  Occupational Therapy Not Seen Note    DATE: 2023    NAME: Mariella Nathan  MRN: [id number]   : 1955      Patient not seen this date for Occupational Therapy due to:    Testing: Pt off floor at [employer].      Electronically signed by Brian Morrell OT on 2023 at 12:15 PM

## 2023-02-23 NOTE — PROGRESS NOTES
SPIRITUAL CARE DEPARTMENT - [facility] [address]  PROGRESS NOTE    Shift date: 2.22.2023  Shift day: Wednesday   Shift # 2    Room # 30/30   Name: Cadence Wilkins                Spiritism: unknown   Place of Restorationism: unknown    Referral: Rapid Response / Code Blue    Admit Date & Time: 2/22/2023  6:28 PM    Assessment:  Cadence Wilkins is a 78 y.o. male in the hospital due to a rapid response/Code Blue. Upon arrival,  met with patient's son who appears to be concerned but coping. Son states frustration at the situation as he was trying to take his father to the ED. Patient appears to be calm and coping.  brought son bedside for support. Intervention:  Writer introduced self and title as  Writer offered space for the patient and son  to express feelings, needs, and concerns and provided a ministry presence. Outcome:  Patient and son appear to be calm and coping at this time. Plan:  Chaplains will remain available to offer spiritual and emotional support as needed. Electronically signed by Mehnaz Bowers on 2/22/2023 at 9:39 PM.  [facility]  [phone]       02/22/23 1828   Encounter Summary   Service Provided For: Patient; Family   Referral/Consult From: Multi-disciplinary team   Support System Children   Last Encounter  02/22/23   Complexity of Encounter Moderate   Begin Time 1828   End Time  1900   Total Time Calculated 32 min   Encounter    Type Initial Screen/Assessment   Crisis   Type Rapid Response   Assessment/Intervention/Outcome   Assessment Anxious; Coping   Intervention Active listening;Discussed illness injury and its impact; Explored/Affirmed feelings, thoughts, concerns   Outcome Engaged in conversation;Expressed feelings, needs, and concerns     Electronically signed by Jessica Weiss on 2/22/2023 at 9:39 PM

## 2023-02-23 NOTE — PLAN OF CARE
[facility]  Office: [address], DO, Sydnee Gardner, DO, Faina Richardson, DO, [facility], DO, Alicia Domingo MD, Chichi Barth MD, Bernadine Cassidy MD, Jude Whitlock MD,  Nehemiah Reyes MD, Anson Jean Baptiste MD, Cecilia Thorpe, DO, Whitney Irving MD,  Dylan Cuadra, DO, Mackenzie Chilel MD, Ekta Gandhi MD, Abdulkadir Whitaker, DO, Neri Soto MD, Rogelio Esteban MD, Tl Collins, DO, Lb Pérez MD, Frantz Swenson MD, Aleta Batista MD, Vladimir Pena MD, Ca Jeffries, DO, Martha Clark MD, Jose Carlos Robertson MD, Yady Shipman, Kurt Carreon, CNP, Idalia Dill, CNP, Priyank Orozco, CNP,  Burnett Osgood, DNP, Lizzeth Lehman, CNP, Marychuy Safe, CNP, Ashley Araiza, CNP, Abner Keen, CNP, Lela Torrez, CNP, Marisol Carreon PA-C, Cira Muñoz, CNS, Marguerite Alfaro, CNP, [facility], [address] [place] [address]    Second Visit Note  For more detailed information please refer to the progress note of the day      2/23/2023    1:32 PM    Name:   Evelia Sheriff  MRN:     [id number]     [place]:      [de-identified]   Room:   [address] [place] [address] Day:  1  Admit Date:  2/22/2023  6:28 PM    PCP:   No primary care provider on file.   Code Status:  Full Code      Pt vitals were reviewed   New labs were reviewed   Patient was seen    Updated plan :     Shortness of breath improved  For stress this AM  Counseled on smoking cessation  Discussed importance of establishing PCP on d/c  Discussed with daughter at bedside       Nehemiah Reyes MD  2/23/2023  1:32 PM

## 2023-02-23 NOTE — PROGRESS NOTES
Physical Therapy  Facility/Department: [facility] RENAL//MED SURG  Physical Therapy Initial Assessment    Name: Neida Khalil  : 1955  MRN: [id number]  Date of Service: 2023    Discharge Recommendations: No therapy recommended at discharge. Chief Complaint   Patient presents with    Chest Pain    Nausea & Vomiting    Chills      Neida Khalil is a 78 y.o.  male with history of tobacco abuse, COPD who presents with Chest Pain, Nausea & Vomiting, and Chills and is admitted to the hospital for the management of COPD exacerbation ([facility] [address] PT Equipment Recommendations  Equipment Needed: No      Patient Diagnosis(es): The primary encounter diagnosis was Chest pain, unspecified type. Diagnoses of Shortness of breath and Hypoxia were also pertinent to this visit. Past Medical History:  has no past medical history on file. Past Surgical History:  has no past surgical history on file. Assessment   Assessment: Pt ambulates 300 ft with no AD independently. Pt should be safe to return to prior living situation with intermittent support as needed. Pt educated on d/c PT, agreeable. Therapy Prognosis: Good  Decision Making: Low Complexity  Barriers to Learning: none  Requires PT Follow-Up: Yes  Activity Tolerance  Activity Tolerance: Patient tolerated treatment well     Plan   Physcial Therapy Plan  General Plan:  (d/c PT)  Safety Devices  Type of Devices: All fall risk precautions in place, Call light within reach, Left in bed, Gait belt     Restrictions  Restrictions/Precautions  Restrictions/Precautions: Up as Tolerated  Required Braces or Orthoses?: No  Position Activity Restriction  Other position/activity restrictions: Can increase activity as tolerated as long as SpO2 greater than 92%.      Subjective   General  Patient assessed for rehabilitation services?: Yes  Response To Previous Treatment: Not applicable  Family / Caregiver Present: No  Follows Commands: Within Functional Limits  Subjective  Subjective: RN and pt agreeable to PT. pt agreeable and pleasant. pt supine in bed at start of session, c/o 2/10 abdominal pain. Social/Functional History  Social/Functional History  Lives With: Alone  Type of Home: Apartment (2nd floor)  Home Layout: One level  Home Access: Stairs to enter with rails  Entrance Stairs - Number of Steps: 15-20  Entrance Stairs - Rails: Both  Bathroom Shower/Tub: Tub/Shower unit  Bathroom Toilet: Standard  Home Equipment:  (no DME at baseline)  Receives Help From: Family  ADL Assistance: Independent  Homemaking Assistance: Independent  Homemaking Responsibilities: Yes  Ambulation Assistance: Independent  Transfer Assistance: Independent  Active : No  Patient's  Info: public transportation or children  Occupation: Retired  Type of Occupation: manager at [employer] [address]: homebody, go downtown  Additional Comments: Pt reports that his neighbor, son, daughter, or grandchildren can provide PRN support as needed.   Vision/Hearing  Vision  Vision: Impaired  Vision Exceptions: Wears glasses for reading  Hearing  Hearing: Within functional limits    Cognition   Orientation  Overall Orientation Status: Within Functional Limits  Cognition  Overall Cognitive Status: WFL             Gross Assessment  Sensation: Intact (pt denies n/t)     AROM RLE (degrees)  RLE AROM: WFL  AROM LLE (degrees)  LLE AROM : WFL  AROM RUE (degrees)  RUE AROM : WFL  AROM LUE (degrees)  LUE AROM : WFL  Strength RLE  Strength RLE: WFL  Strength LLE  Strength LLE: WFL  Strength RUE  Strength RUE: WFL  Strength LUE  Strength LUE: WFL           Bed mobility  Supine to Sit: Modified independent  Sit to Supine: Modified independent  Scooting: Modified independent  Bed Mobility Comments: HOB elevated  Transfers  Sit to Stand: Independent  Stand to Sit: Independent  Comment: no AD used  Ambulation  Surface: Level tile  Device: No Device  Other Apparatus: O2 (2 L per NC)  Assistance: Independent  Gait Deviations: Slow Yoko  Distance: 300 ft  Comments: No LOB noted.   More Ambulation?: No  Stairs/Curb  Stairs?: No     Balance  Posture: Good  Sitting - Static: Good  Sitting - Dynamic: Good  Standing - Static: Good  Standing - Dynamic: Good  Comments: No AD used                                                           AM-PAC Score  AM-PAC Inpatient Mobility Raw Score : 24 (02/23/23 1549)  AM-PAC Inpatient T-Scale Score : 61.14 (02/23/23 1549)  Mobility Inpatient CMS 0-100% Score: 0 (02/23/23 1549)  Mobility Inpatient CMS G-Code Modifier : [address] (02/23/23 1549)             Goals  Short Term Goals  Time Frame for Short Term Goals: d/c PT       Education  Patient Education  Education Given To: Patient  Education Provided: Role of Therapy;Plan of Care  Education Method: Demonstration;Verbal  Barriers to Learning: None  Education Outcome: Verbalized understanding;Demonstrated understanding      Therapy Time   Individual Concurrent Group Co-treatment   Time In 1408         Time Out 1426         Minutes 18         Timed Code Treatment Minutes: [address] [facility], PT

## 2023-02-23 NOTE — RT PROTOCOL NOTE
RT Inhaler-Nebulizer Bronchodilator Protocol Note    There is a bronchodilator order in the chart from a provider indicating to follow the RT Bronchodilator Protocol and there is an Initiate RT Inhaler-Nebulizer Bronchodilator Protocol order as well (see protocol at bottom of note). CXR Findings: no acute process    The findings from the last RT Protocol Assessment were as follows:   History Pulmonary Disease:  no hx  Respiratory Pattern: unlabored  Breath Sounds:  clear , dim in bases  Cough:  none  Indication for Bronchodilator Therapy:  sob  Bronchodilator Assessment Score: 3     Aerosolized bronchodilator medication orders have been revised according to the RT Inhaler-Nebulizer Bronchodilator Protocol below. Respiratory Therapist to perform RT Therapy Protocol Assessment initially then follow the protocol. Repeat RT Therapy Protocol Assessment PRN for score 0-3 or on second treatment, BID, and PRN for scores above 3. No Indications - adjust the frequency to every 6 hours PRN wheezing or bronchospasm, if no treatments needed after 48 hours then discontinue using Per Protocol order mode. If indication present, adjust the RT bronchodilator orders based on the Bronchodilator Assessment Score as indicated below. Use Inhaler orders unless patient has one or more of the following: on home nebulizer, not able to hold breath for 10 seconds, is not alert and oriented, cannot activate and use MDI correctly, or respiratory rate 25 breaths per minute or more, then use the equivalent nebulizer order(s) with same Frequency and PRN reasons based on the score. If a patient is on this medication at home then do not decrease Frequency below that used at home. 0-3 - enter or revise RT bronchodilator order(s) to equivalent RT Bronchodilator order with Frequency of every 4 hours PRN for wheezing or increased work of breathing using Per Protocol order mode.         4-6 - enter or revise RT Bronchodilator order(s) to two equivalent RT bronchodilator orders with one order with BID Frequency and one order with Frequency of every 4 hours PRN wheezing or increased work of breathing using Per Protocol order mode. 7-10 - enter or revise RT Bronchodilator order(s) to two equivalent RT bronchodilator orders with one order with TID Frequency and one order with Frequency of every 4 hours PRN wheezing or increased work of breathing using Per Protocol order mode. 11-13 - enter or revise RT Bronchodilator order(s) to one equivalent RT bronchodilator order with QID Frequency and an Albuterol order with Frequency of every 4 hours PRN wheezing or increased work of breathing using Per Protocol order mode. Greater than 13 - enter or revise RT Bronchodilator order(s) to one equivalent RT bronchodilator order with every 4 hours Frequency and an Albuterol order with Frequency of every 2 hours PRN wheezing or increased work of breathing using Per Protocol order mode. RT to enter RT Home Evaluation for COPD & MDI Assessment order using Per Protocol order mode.     Electronically signed by Kamari Sahu RCP on 2/23/2023 at 7:53 AM

## 2023-02-23 NOTE — PROCEDURES
[facility]                              CARDIAC STRESS TEST    PATIENT NAME: Rachel Khan                 :        1955  MED REC NO:   [id number]                             ROOM:       0319  ACCOUNT NO:   [de-identified]                           ADMIT DATE: 2023  PROVIDER:     Neymar Banuelos    DATE OF STUDY:  2023    ORDERING PROVIDER:  Vinnie Fuchs MD    PRIMARY CARE PROVIDER:  Rai Nuñez MD    INTERPRETING PHYSICIAN:  Neymar Banuelos MD    CARDIOLITE TREADMILL STRESS STUDY:    Procedure was explained and consent was given    Indication:  Chest pain, ECG abnormalities    Protocol:  Dav  Resting HR:  104 bpm  Resting BP:  154/77 mm/Hg  Maximum HR:  151 bpm, or 98% of age predicted maximum heart rate  Peak BP:  188/76 mm/Hg  Resting EKG:  Normal (normal sinus rhythm with PAC's)  Duration:  4 minutes and 57 seconds  METS:  6.0  Reason for termination:  Reached target heart rate  Stress heart response:  Normal  Stress BP response:  Hypertensive  Stress EKG's:  Normal (PAC's and PVC's)  Chest discomfort:  None  Ischemic EKG changes:  None    IMPRESSION:  Electrocardiographically negative treadmill stress study. Radio-isotope  results to follow from the department of Nuclear Medicine.       Jesica Fierro    D: 2023 14:42:50       T: 2023 14:43:40     ROYA/EDITA  Job#: [id number]     Doc#: Unknown    CC:    ()

## 2023-02-23 NOTE — ED PROVIDER NOTES
[address] [facility]     Emergency Department     Faculty Attestation    I performed a history and physical examination of the patient and discussed management with the resident. I reviewed the resident´s note and agree with the documented findings and plan of care. Any areas of disagreement are noted on the chart. I was personally present for the key portions of any procedures. I have documented in the chart those procedures where I was not present during the key portions. I have reviewed the emergency nurses triage note. I agree with the chief complaint, past medical history, past surgical history, allergies, medications, social and family history as documented unless otherwise noted below. For Physician Assistant/ Nurse Practitioner cases/documentation I have personally evaluated this patient and have completed at least one if not all key elements of the E/M (history, physical exam, and MDM). Additional findings are as noted. Mild clinical dehydration, equal breath sounds, heart regular rate and rhythm with grade 2/6 systolic murmur heard best at the apex, mild epigastric pain without guarding or rebounding, no lower extremity pain or swelling on examination. Patient is 92% on 2 L nasal cannula oxygen. Patient denies recent weight loss vomiting blood or dark stools.        EKG Interpretation    Interpreted by emergency department physician    Rhythm: normal sinus   Rate: normal/97  Axis: Right 147 degrees  Ectopy: Sinus arrhythmia  Conduction: normal  ST Segments: no acute change  T Waves: no acute change  Q Waves: none    Clinical Impression: Abnormal EKG    SERGE Valdivia MD  02/22/23 8698

## 2023-02-23 NOTE — H&P
[facility]  Office: [address], DO, Caitie Venegas, DO, Abhay Phlegm, DO, Asha Liam Blood, DO, Ksenia Hurtado MD, Get Brush MD, Rigo Pineda MD, Kellie Lopez MD,  Leia Salas MD, Katja Tse MD, Taisha Dent, DO, Marily Oakes MD,  Yamilka Osman MD, Anderson Casiano MD, Cindy Flores, DO, Danial Maddox MD, Chrystal Gomez MD, Naveen Richardson, DO, Micha Vincent MD, Viki Biswas MD, Kenia Diallo MD, Shana Quinteros MD, Tae Reyna DO, Kerwin Pretty MD, Keenan Hussein MD, Leslie Taylor, CNP,  Ashley Goodman, CNP, Meenakshi Joel, CNP, Micheal Shannon, CNP,  Trung Barros, [facility], Jr Garcia, CNP, Prabha Armstrong, CNP, Jodee Guerra, CNP, Darern Olmedo, CNP, Antony Libman, CNP, Kendra Ceron PA-C, Mary Grant, CNS, Shasta Tapia, CNP, Cecilia Bernabe, [facility]    HISTORY AND PHYSICAL EXAMINATION            Date:   2/23/2023  Patient name:  Edmar Weber  Date of admission:  2/22/2023  6:28 PM  MRN:   [id number]  Account:  [de-identified]  YOB: 1955  PCP:    No primary care provider on file. Room:   [address] [place] [address]  Code Status:    Full Code    Chief Complaint:     Chief Complaint   Patient presents with    Chest Pain    Nausea & Vomiting    Chills   \" I was sick\"    History Obtained From:     patient    History of Present Illness:     Edmar Weber is a 78 y.o.  male with history of tobacco abuse, COPD who presents with Chest Pain, Nausea & Vomiting, and Chills   and is admitted to the hospital for the management of COPD exacerbation ([facility] [address] Patient describes 6-month history of intermittent nonspecific somatic complaints/ constellation of symptoms with intermittent nausea vomiting with epigastric abdominal pain with diarrhea.   Patient does get bouts 1-2 times a week with epigastric mid abdominal pain, cramping quality last for hours with associated nausea and diarrhea. Symptoms relapsed earlier prior to arrival and he has had 3 loose bowel movements earlier today with nausea, denies emesis. Denies prior GI work-up, denies sick contacts, denies rectal bleeding or melanotic stool. Denies weight changes. Denies change in bowel pattern. Denies prior colonoscopy.  + Occasional intermittent indigestion but denies any acute changes. Patient does describe intermittent chest pain that is epigastric in origin sharp quality intermittently with tightness. Denies associated shortness of breath difficulty breathing pleurisy or URI symptoms. Does struggle with intermittent subjective fevers and chills over the past several months with hot flashes. Denies limb claudication, denies exertional component, denies positional changes. denies recent travel, denies prior history of MI CHF TIA stroke or DVT PE. Past Medical History:     Admits that he does not have a PCP and has not seen a doctor in several years    Past Surgical History:     Denies prior surgery    Medications Prior to Admission:     Prior to Admission medications    Medication Sig Start Date End Date Taking? Authorizing Provider   aspirin 81 MG EC tablet Take 81 mg by mouth daily   Yes Historical Provider, MD        Allergies:     Patient has no known allergies. Social History:     Tobacco:    reports that he has been smoking cigarettes. He has a 25.00 pack-year smoking history. He does not have any smokeless tobacco history on file. Alcohol:      has no history on file for alcohol use. Drug Use:  reports current drug use. Drug: Marijuana Brandi Palm). Continues to smoke marijuana daily in addition does smoke cigarettes, denies excessive binge drinking or other illegal drugs    Family History:     Denies family history premature coronary disease DVT PE or premature stroke. Denies family history of GI malignancy    Review of Systems:     Positive and Negative as described in HPI.     Review of Systems  Patient denies any prior age-appropriate cancer screening, denies prostate or colon screening, denies urinary symptoms no dysuria hematuria frequency urgency. Denies flank pain. Denies dysphagia or odynophagia or mouth sores. Does have chronic occasional cough without worsening phlegm production, denies hemoptysis. Denies recent travel. Denies headaches sinus congestion or other URI symptoms. Denies easy bruising or bleeding issues, denies sleep apnea. Denies prior DVT PE. Denies acute vision changes. 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 Patient unclear of details regarding his vision issues. Denies ocular stroke. Denies trauma. Denies myalgias cephalgias or back pain radicular symptoms. Denies heat or cold intolerance. Denies alopecia, facial rash sun sensitivity. Denies history of malignancy. Prior history of COVID in , denies recent flu/COVID exposure. review of systems otherwise -12 system reviewed otherwise unremarkable    Physical Exam:   BP (!) 145/78   Pulse 96   Temp 98.4 °F (36.9 °C) (Oral)   Resp 20   Ht 6' (1.829 m)   Wt 190 lb (86.2 kg)   SpO2 94%   BMI 25.77 kg/m²   Temp (24hrs), Av.3 °F (36.8 °C), Min:98.3 °F (36.8 °C), Max:98.4 °F (36.9 °C)    No results for input(s): POCGLU in the last 72 hours.   No intake or output data in the 24 hours ending 23 0641    Physical Exam  General sleeping but arouses easily, pleasant upon awakening, appropriate, follows commands  Eye exam pupils equally round reactive sclera nonicteric normal horizontal gaze  ENT oropharynx with moist mucous members face metric neck supple  Cardiovascular regular rate and rhythm normal S1-S2 no murmurs or gallops no JVD  Lungs adequate air exchange nonlabored no tachypnea no accessory muscle use no wheezing or rhonchi slightly diminished at bases  GI soft nontender nondistended bowel sounds present  Vascular intact dorsalis pedis posterior tibialis without significant pedal edema  Neuro nonfocal normal speech and cognition strength symmetric in upper and lower limbs sensation grossly intact  Musculoskeletal passive range of motion of upper and lower limbs unremarkable no synovitis or joint effusions  Derm no rashes lesions or skin infections    Investigations:      Laboratory Testing:  Recent Results (from the past 24 hour(s))   CBC with Auto Differential    Collection Time: 02/22/23  6:37 PM   Result Value Ref Range    WBC 9.5 3.5 - 11.3 k/uL    RBC 4.59 4.21 - 5.77 m/uL    Hemoglobin 15.5 13.0 - 17.0 g/dL    Hematocrit 43.5 40.7 - 50.3 %    MCV 94.8 82.6 - 102.9 fL    MCH 33.8 (H) 25.2 - 33.5 pg    MCHC 35.6 (H) 28.4 - 34.8 g/dL    RDW 12.4 11.8 - 14.4 %    Platelets 716 004 - 815 k/uL    MPV 9.1 8.1 - 13.5 fL    NRBC Automated 0.0 0.0 per 100 WBC    Seg Neutrophils 80 (H) 36 - 65 %    Lymphocytes 16 (L) 24 - 43 %    Monocytes 4 3 - 12 %    Eosinophils % 0 (L) 1 - 4 %    Basophils 0 0 - 2 %    Immature Granulocytes 0 0 %    Segs Absolute 7.49 1.50 - 8.10 k/uL    Absolute Lymph # 1.53 1.10 - 3.70 k/uL    Absolute Mono # 0.41 0.10 - 1.20 k/uL    Absolute Eos # <0.03 0.00 - 0.44 k/uL    Basophils Absolute 0.03 0.00 - 0.20 k/uL    Absolute Immature Granulocyte <0.03 0.00 - 0.30 k/uL   CMP    Collection Time: 02/22/23  6:37 PM   Result Value Ref Range    Glucose 123 (H) 70 - 99 mg/dL    BUN 11 8 - 23 mg/dL    Creatinine 0.96 0.70 - 1.20 mg/dL    Est, Glom Filt Rate >60 >60 mL/min/1.73m2    Calcium 9.8 8.6 - 10.4 mg/dL    Sodium 136 135 - 144 mmol/L    Potassium 4.1 3.7 - 5.3 mmol/L    Chloride 101 98 - 107 mmol/L    CO2 20 20 - 31 mmol/L    Anion Gap 15 9 - 17 mmol/L    Alkaline Phosphatase 88 40 - 129 U/L    ALT 10 5 - 41 U/L    AST 15 <40 U/L    Total Bilirubin 0.9 0.3 - 1.2 mg/dL    Total Protein 7.5 6.4 - 8.3 g/dL    Albumin 4.4 3.5 - 5.2 g/dL    Albumin/Globulin Ratio 1.4 1.0 - 2.5   Troponin Collection Time: 02/22/23  6:37 PM   Result Value Ref Range    Troponin, High Sensitivity 12 0 - 22 ng/L   Brain Natriuretic Peptide    Collection Time: 02/22/23  6:37 PM   Result Value Ref Range    Pro- (H) <300 pg/mL   COVID-19, Rapid    Collection Time: 02/22/23  6:40 PM    Specimen: Nasopharyngeal Swab   Result Value Ref Range    Specimen Description . NASOPHARYNGEAL SWAB     SARS-CoV-2, Rapid Not Detected Not Detected   RAPID INFLUENZA A/B ANTIGENS    Collection Time: 02/22/23  6:50 PM    Specimen: Nasopharyngeal   Result Value Ref Range    Flu A Antigen NEGATIVE NEGATIVE    Flu B Antigen NEGATIVE NEGATIVE   Troponin    Collection Time: 02/22/23  7:27 PM   Result Value Ref Range    Troponin, High Sensitivity 11 0 - 22 ng/L   Sedimentation Rate    Collection Time: 02/23/23  2:36 AM   Result Value Ref Range    Sed Rate 16 0 - 20 mm/Hr   C-Reactive Protein    Collection Time: 02/23/23  2:36 AM   Result Value Ref Range    CRP 6.9 (H) 0.0 - 5.0 mg/L   Lactate Dehydrogenase    Collection Time: 02/23/23  2:36 AM   Result Value Ref Range     135 - 225 U/L   Basic Metabolic Panel w/ Reflex to MG    Collection Time: 02/23/23  2:36 AM   Result Value Ref Range    Glucose 144 (H) 70 - 99 mg/dL    BUN 13 8 - 23 mg/dL    Creatinine 1.02 0.70 - 1.20 mg/dL    Est, Glom Filt Rate >60 >60 mL/min/1.73m2    Calcium 9.1 8.6 - 10.4 mg/dL    Sodium 139 135 - 144 mmol/L    Potassium 4.1 3.7 - 5.3 mmol/L    Chloride 104 98 - 107 mmol/L    CO2 23 20 - 31 mmol/L    Anion Gap 12 9 - 17 mmol/L       Imaging/Diagnostics:  XR CHEST (2 VW)    Result Date: 2/22/2023  No acute process. EKG no acute ST-T changes, right axis deviation    CT CHEST PULMONARY EMBOLISM W CONTRAST    Result Date: 2/22/2023  1. No evidence of pulmonary embolism or acute pulmonary abnormality. 2.  Emphysema. 3.  Diverticulosis. 4.  Calcified coronary atheromatous plaque.        Assessment :      Hospital Problems             Last Modified POA    * (Principal) COPD exacerbation ([facility] [address] 2/22/2023 Yes    Chest pain in adult 2/23/2023 Yes    Tobacco abuse 2/23/2023 Yes    Epigastric abdominal pain 2/23/2023 Yes    Nausea 2/23/2023 Yes    Diarrhea 2/23/2023 Yes       Plan:     Patient status observation in the Med/Surge    Atypical chest pain intermittent over the past 3 to 6 months. Clinically sounds more GI in origin with nausea diarrhea and epigastric abdominal pain. Preliminary cardiac markers are unremarkable, EKG without acute ST-T changes. CT angio PE unremarkable. Pending stress test to evaluate ischemia. Intermittent recurrent nausea/ epigastric abdominal pain with intermittent diarrhea-Labs stable. Describes persistent symptoms for the past 3 to 6 months. Discussed with patient outpatient work-up, follow-up with GI if appropriate. Check thyroid studies. Discussed with patient age-appropriate cancer screening including colonoscopy, prostate evaluation as outpatient  Malaise fatigue with subjective fevers chills. Labs otherwise stable with consideration for outpatient further evaluation. Pending thyroid studies. Chronic tobacco abuse, marijuana use discussed with patient cessation especially and lieu of GI symptoms with concern for possible hyperemesis cannabis syndrome which was discussed with patient  Acute COPD exacerbation clinically improved after bronchodilator, IV steroids in ED. wean off oxygen. Continue prednisone taper, bronchodilators, encouraged tobacco cessation. Outpatient follow-up with nonspecific somatic complaints. Counseling regarding hyperemesis cannabis syndrome contributing to recurrent GI symptoms. Encouraged outpatient GI evaluation. Need to establish with PCP discussed for ongoing routine health maintenance and further evaluation of symptoms    Likely discharge later today if stress test okay discussed with patient.     Consultations:   IP CONSULT TO HOSPITALIST    Patient is admitted as inpatient status because of co-morbidities listed above, severity of signs and symptoms as outlined, requirement for current medical therapies and most importantly because of direct risk to patient if care not provided in a hospital setting. Expected length of stay > 48 hours. Golden Bennett MD  2/23/2023  6:41 AM    Copy sent to Dr. Eldridge primary care provider on file.

## 2023-02-24 VITALS
RESPIRATION RATE: 18 BRPM | SYSTOLIC BLOOD PRESSURE: 135 MMHG | HEART RATE: 71 BPM | HEIGHT: 72 IN | DIASTOLIC BLOOD PRESSURE: 69 MMHG | OXYGEN SATURATION: 92 % | BODY MASS INDEX: 22.84 KG/M2 | TEMPERATURE: 98.4 F | WEIGHT: 168.65 LBS

## 2023-02-24 PROCEDURE — 6370000000 HC RX 637 (ALT 250 FOR IP): Performed by: INTERNAL MEDICINE

## 2023-02-24 PROCEDURE — 6360000002 HC RX W HCPCS: Performed by: CLINICAL NURSE SPECIALIST

## 2023-02-24 PROCEDURE — 99239 HOSP IP/OBS DSCHRG MGMT >30: CPT | Performed by: INTERNAL MEDICINE

## 2023-02-24 PROCEDURE — 6370000000 HC RX 637 (ALT 250 FOR IP): Performed by: NURSE PRACTITIONER

## 2023-02-24 PROCEDURE — 6370000000 HC RX 637 (ALT 250 FOR IP): Performed by: CLINICAL NURSE SPECIALIST

## 2023-02-24 RX ORDER — ALBUTEROL SULFATE 90 UG/1
2 AEROSOL, METERED RESPIRATORY (INHALATION) 4 TIMES DAILY PRN
Qty: 18 G | Refills: 5 | Status: SHIPPED | OUTPATIENT
Start: 2023-02-24

## 2023-02-24 RX ORDER — PREDNISONE 20 MG/1
40 TABLET ORAL DAILY
Qty: 6 TABLET | Refills: 0 | Status: SHIPPED | OUTPATIENT
Start: 2023-02-25 | End: 2023-02-28

## 2023-02-24 RX ORDER — MINERAL OIL AND WHITE PETROLATUM 150; 830 MG/G; MG/G
OINTMENT OPHTHALMIC
Status: DISCONTINUED | OUTPATIENT
Start: 2023-02-24 | End: 2023-02-24 | Stop reason: HOSPADM

## 2023-02-24 RX ADMIN — ENOXAPARIN SODIUM 40 MG: 100 INJECTION SUBCUTANEOUS at 07:49

## 2023-02-24 RX ADMIN — PREDNISONE 40 MG: 20 TABLET ORAL at 07:49

## 2023-02-24 RX ADMIN — FAMOTIDINE 20 MG: 20 TABLET, FILM COATED ORAL at 07:49

## 2023-02-24 RX ADMIN — MINERAL OIL, PETROLATUM: 425; 573 OINTMENT OPHTHALMIC at 05:53

## 2023-02-24 RX ADMIN — AZITHROMYCIN 500 MG: 250 TABLET, FILM COATED ORAL at 07:49

## 2023-02-24 ASSESSMENT — PAIN SCALES - GENERAL: PAINLEVEL_OUTOF10: 0

## 2023-02-24 NOTE — PLAN OF CARE
Problem: Discharge Planning  Goal: Discharge to home or other facility with appropriate resources  2/24/2023 1138 by Jerri Fuentes RN  Outcome: Completed  2/24/2023 0226 by Lluvia Haider RN  Outcome: Progressing     Problem: Pain  Goal: Verbalizes/displays adequate comfort level or baseline comfort level  2/24/2023 1138 by Jerri Fuentes RN  Outcome: Completed  2/24/2023 0226 by Lluvia Haider RN  Outcome: Progressing     Problem: ABCDS Injury Assessment  Goal: Absence of physical injury  2/24/2023 1138 by Jerri Fuentes RN  Outcome: Completed  2/24/2023 0226 by Lluvia Haider RN  Outcome: Progressing     Problem: Respiratory - Adult  Goal: Achieves optimal ventilation and oxygenation  2/24/2023 1138 by Jerri Fuentes RN  Outcome: Completed  2/24/2023 0226 by Lluvia Haider RN  Outcome: Progressing     Problem: Nutrition Deficit:  Goal: Optimize nutritional status  2/24/2023 1138 by Jerri Fuentes RN  Outcome: Completed  2/24/2023 0226 by Lluvia Haider RN  Outcome: Progressing

## 2023-02-24 NOTE — PROGRESS NOTES
[facility]  Office: [address], DO, Izabela Cordova, DO, Opal Leblanc, DO, Tejas Mario Lynne, DO, Elinor Douglas MD, Dayana Nelson MD, Lokesh Lynch MD, Mainor Morillo MD,  Ladarius Burnett MD, Golden Simental MD, Sadie Oden DO, Reba Arthur MD,  Bhupinder Trinidad MD, Zeb Muñoz MD, Nadine Campbell DO, Gail Khan MD, Jose Kahn MD, Prabha Correa DO, Yeny Arredondo MD, Shanna Kathleen MD, Tex Herrera MD, Ulises Abreu MD, Amara Pascal DO, Kaity Christianson MD, Petra Dueñas MD, Destin Galeana, CNP,  Kimberli Glez, CNP, Harriet Gan, CNP, Jo Ann Reilly, CNP,  Mccoy Baumgarten, [facility], Moi Moreno, CNP, Lakshmi Lundy, CNP, Rad Bruce, CNP, Kim Leos, CNP, Tammy Bonner, CNP, CIELO CortesC, Ned Christian, CNS, Gina Waters, [facility], [address] [place] [address]    Progress Note    2/24/2023    11:44 AM    Name:   Julienne Quezada  MRN:     [id number]     [place]:      [de-identified]   Room:   [address] [place] [address] Day:  2  Admit Date:  2/22/2023  6:28 PM    PCP:   No primary care provider on file. Code Status:  Full Code    Subjective:     C/C:   Chief Complaint   Patient presents with    Chest Pain    Nausea & Vomiting    Chills     Interval History Status: significantly improved. -no oxygen requirements currently  -Denies dyspnea this morning, states he is back to baseline   -he states he is ready to be discharged    Brief History:        Julienne Quezada is a 78 y.o.  male with history of tobacco abuse, COPD who presents with Chest Pain, Nausea & Vomiting, and Chills   and is admitted to the hospital for the management of COPD exacerbation ([facility] [address] Patient describes 6-month history of intermittent nonspecific somatic complaints/ constellation of symptoms with intermittent nausea vomiting with epigastric abdominal pain with diarrhea.   Patient does get bouts 1-2 times a week with epigastric mid abdominal pain, cramping quality last for hours with associated nausea and diarrhea. Symptoms relapsed earlier prior to arrival and he has had 3 loose bowel movements earlier today with nausea, denies emesis. Denies prior GI work-up, denies sick contacts, denies rectal bleeding or melanotic stool. Denies weight changes. Denies change in bowel pattern. Denies prior colonoscopy.  + Occasional intermittent indigestion but denies any acute changes. Patient does describe intermittent chest pain that is epigastric in origin sharp quality intermittently with tightness. Denies associated shortness of breath difficulty breathing pleurisy or URI symptoms. Does struggle with intermittent subjective fevers and chills over the past several months with hot flashes. Denies limb claudication, denies exertional component, denies positional changes. denies recent travel, denies prior history of MI CHF TIA stroke or DVT PE. Review of Systems:     Constitutional:  negative for chills, fevers, sweats  Respiratory:  negative for cough, dyspnea on exertion, shortness of breath, wheezing  Cardiovascular:  negative for chest pain, chest pressure/discomfort, lower extremity edema, palpitations  Gastrointestinal:  negative for abdominal pain, constipation, diarrhea, nausea, vomiting  Neurological:  negative for dizziness, headache    Medications:      Allergies:  No Known Allergies    Current Meds:   Scheduled Meds:    predniSONE  40 mg Oral Daily    famotidine  20 mg Oral BID    sodium chloride flush  5-40 mL IntraVENous 2 times per day    enoxaparin  40 mg SubCUTAneous Daily    azithromycin  500 mg Oral Daily     Continuous Infusions:    sodium chloride 75 mL/hr at 02/23/23 1116    sodium chloride       PRN Meds: artificial tears, ipratropium-albuterol, sodium chloride flush, sodium chloride flush, melatonin, sodium chloride flush, sodium chloride, ondansetron **OR** ondansetron, polyethylene glycol, acetaminophen **OR** acetaminophen    Data:     Past Medical History:   has no past medical history on file. Social History:   reports that he has been smoking cigarettes. He has a 25.00 pack-year smoking history. He does not have any smokeless tobacco history on file. He reports current drug use. Drug: Marijuana Gerardo Thienker). Family History: History reviewed. No pertinent family history. Vitals:  /69   Pulse 71   Temp 98.4 °F (36.9 °C) (Oral)   Resp 18   Ht 6' (1.829 m)   Wt 168 lb 10.4 oz (76.5 kg)   SpO2 92%   BMI 22.87 kg/m²   Temp (24hrs), Av.3 °F (36.8 °C), Min:98 °F (36.7 °C), Max:98.4 °F (36.9 °C)    No results for input(s): POCGLU in the last 72 hours. I/O (24Hr): No intake or output data in the 24 hours ending 23 1144    Labs:  Hematology:  Recent Labs     23  0236   WBC 9.5  --    RBC 4.59  --    HGB 15.5  --    HCT 43.5  --    MCV 94.8  --    MCH 33.8*  --    MCHC 35.6*  --    RDW 12.4  --      --    MPV 9.1  --    SEDRATE  --  16   CRP  --  6.9*     Chemistry:  Recent Labs     23  0236     --  139   K 4.1  --  4.1     --  104   CO2 20  --  23   GLUCOSE 123*  --  144*   BUN 11  --  13   CREATININE 0.96  --  1.02   ANIONGAP 15  --  12   LABGLOM >60  --  >60   CALCIUM 9.8  --  9.1   PROBNP 330*  --   --    TROPHS 12 11  --      Recent Labs     23  0236   PROT 7.5  --    LABALBU 4.4  --    TSH  --  1.27   AST 15  --    ALT 10  --    LDH  --  164   ALKPHOS 88  --    BILITOT 0.9  --      ABG:No results found for: POCPH, PHART, PH, POCPCO2, HVN9BAV, PCO2, POCPO2, PO2ART, PO2, POCHCO3, MVI0JYR, HCO3, NBEA, PBEA, BEART, BE, THGBART, THB, SGL7YQS, VNTZ7BYR, P4DHFCJL, O2SAT, FIO2  No results found for: SPECIAL  No results found for: CULTURE    Radiology:  XR CHEST (2 VW)    Result Date: 2023  No acute process.      XR CHEST PORTABLE    Result Date: 2023  No acute findings in the chest.     CT CHEST PULMONARY EMBOLISM W CONTRAST    Result Date: 2/22/2023  1. No evidence of pulmonary embolism or acute pulmonary abnormality. 2.  Emphysema. 3.  Diverticulosis. 4.  Calcified coronary atheromatous plaque. NM Cardiac Stress Test Nuclear Imaging    Result Date: 2/23/2023  Diaphragmatic attenuation artifact in the inferior wall. No stress-induced ischemia Normal LVEF. Risk stratification: Low       Physical Examination:        General appearance:  alert, cooperative and no distress  Mental Status:  oriented to person, place and time and normal affect  Lungs:  No distress. Normal effort.  Mild expiratory wheezing   Heart:  regular rate and rhythm  Abdomen:  soft, nontender, nondistended, normal bowel sounds  Extremities:  no edema, redness, tenderness in the calves  Skin:  no gross lesions, rashes, induration    Assessment:        Hospital Problems             Last Modified POA    * (Principal) COPD exacerbation ([address] 2/22/2023 Yes    Chest pain in adult 2/23/2023 Yes    Tobacco abuse 2/23/2023 Yes    Epigastric abdominal pain 2/23/2023 Yes    Nausea 2/23/2023 Yes    Diarrhea 2/23/2023 Yes    Chest pain 2/23/2023 Yes       Plan:        Shortness of breath  Likely COPD due to 50 pack year smoking history, wheezing on exam  Negative CXR, influenza, COVID  Improved with IV steroids and bronchodilators  Will give PO steroids and Albuterol on discharge   Pt is agreeable to follow up with a PCP for further management  Daughter will help facilitate   Chest pain  Negative stress test   Cardiac enzymes negative  CT PE negative  EKG without acute changes   Chronic tobacco use  Counseled on smoking cessation     Devon Farr  2/24/2023  11:44 AM       Attending Supervising Ladarius Gonzales MD, have seen and examined Julienne Memos and personally performed and participated in the key or critical portions of the evaluation and management including personally performing the exam and medical decision making. I verify the accuracy of the documentation by the medical/PA student with the following additions/changes. Additional Comments: shortness of breath improved. DC planning. Establish PCP on d/c. Discussed with daughter at bedside.  DC time > 30 min     Electronically signed by Deer Parkr Hashimoto, MD on 2/24/2023 at 1:24 PM

## 2023-02-24 NOTE — PROGRESS NOTES
CLINICAL PHARMACY NOTE: MEDS TO BEDS    Total # of Prescriptions Filled: 2   The following medications were delivered to the patient:  Albuterol Inhaler  Prednisone 20mg    Additional Documentation: delivered to patient in room 319 2/24 at 12:14pm. Co-pay $4.56 cash.

## 2023-02-24 NOTE — PLAN OF CARE
Problem: Discharge Planning  Goal: Discharge to home or other facility with appropriate resources  2/24/2023 0226 by Gurpreet Bronson RN  Outcome: Progressing  2/23/2023 1728 by Pilo Pelayo RN  Outcome: Progressing     Problem: Pain  Goal: Verbalizes/displays adequate comfort level or baseline comfort level  2/24/2023 0226 by Gurpreet Bronson RN  Outcome: Progressing  2/23/2023 1728 by Pilo Pelayo RN  Outcome: Progressing     Problem: ABCDS Injury Assessment  Goal: Absence of physical injury  2/24/2023 0226 by Gurpreet Bronson RN  Outcome: Progressing  2/23/2023 1728 by Pilo Pelayo RN  Outcome: Progressing     Problem: Respiratory - Adult  Goal: Achieves optimal ventilation and oxygenation  2/24/2023 0226 by Gurpreet Bronson RN  Outcome: Progressing  2/23/2023 1728 by Pilo Pelayo RN  Outcome: Progressing     Problem: Nutrition Deficit:  Goal: Optimize nutritional status  2/24/2023 0226 by Gurpreet Bronson RN  Outcome: Progressing  2/23/2023 1728 by Pilo Pelayo RN  Outcome: Progressing  2/23/2023 1511 by Lorena Delatorre RD  Flowsheets (Taken 2/23/2023 1511)  Nutrient intake appropriate for improving, restoring, or maintaining nutritional needs:   Assess nutritional status and recommend course of action   Recommend appropriate diets, oral nutritional supplements, and vitamin/mineral supplements   Monitor oral intake, labs, and treatment plans

## 2023-02-24 NOTE — DISCHARGE SUMMARY
[facility]  Office: [address], DO, John Martinez, DO, Laureano Harris, DO, Ham Lynne, DO, Terence Carvalho MD, Noel Bruce MD, Cordelia Garza MD, Luvenia Duverney, MD,  Deng Bacon MD, Modesta Castellano MD, Jl Garibay, DO, Raj Gilliam MD,  Peyton Lyons MD, Brenda Alfaro MD, Shanna Perez, DO, Kimberly Willis MD, Oral Anders MD, Layla Gamble DO, Venkat Stern MD, Carola Balbuena MD, Mary Ann Velasquez MD, Arun Walter MD, Hany Armijo, DO, Riana Pizarro MD, Rocio Heredia MD, Clint Hughes, CNP,  Nicole Pdeersen, CNP, Boy Clark, CNP, Rosa Maria Gee, CNP,  [address], [facility], Xavier Gil, CNP, Lorenza Duarte, CNP, Bianca Duke, CNP, Vick Donaldson, CNP, Denzel Matias, CNP, Roberto Flynn PA-C, Orlando Ross, [facility], Tristan Figueroa, CNP, Von [place], CNP         Jeremy Gandhi [address]    Discharge Summary     Patient ID: Jdui Aquino  :  1955   MRN: [id number]     ACCOUNT:  [de-identified]   Patient's PCP: No primary care provider on file. Admit Date: 2023   Discharge Date: 2023  Length of Stay: 2  Code Status:  Full Code  Admitting Physician: Deng Bacon MD  Discharge Physician: Deng Bacon MD     Active Discharge Diagnoses:     Hospital Problem Lists:  Principal Problem:    COPD exacerbation [facility])  Active Problems:    Chest pain in adult    Tobacco abuse    Epigastric abdominal pain    Nausea    Diarrhea    Chest pain  Resolved Problems:    * No resolved hospital problems. *      Admission Condition:  fair     Discharged Condition: stable    Hospital Stay:     Hospital Course:    Judi Aquino is a 78 y.o.  male with history of tobacco abuse who presented with chest pain and shortness of breath. Patient describes 6-month history of intermittent nonspecific somatic complaints/ constellation of symptoms with intermittent nausea vomiting. CT PE in ER negative. Admitted for COPD exacerbation. Treated with steroids with improvement in symptoms. Underwent stress test for chest pain that was negative for ischemia. Weaned off O2, dc home.      Significant therapeutic interventions: as above     Significant Diagnostic Studies:   Labs / Micro:  CBC:   Lab Results   Component Value Date/Time    WBC 9.5 02/22/2023 06:37 PM    RBC 4.59 02/22/2023 06:37 PM    HGB 15.5 02/22/2023 06:37 PM    HCT 43.5 02/22/2023 06:37 PM    MCV 94.8 02/22/2023 06:37 PM    MCH 33.8 02/22/2023 06:37 PM    MCHC 35.6 02/22/2023 06:37 PM    RDW 12.4 02/22/2023 06:37 PM     02/22/2023 06:37 PM     BMP:    Lab Results   Component Value Date/Time    GLUCOSE 144 02/23/2023 02:36 AM     02/23/2023 02:36 AM    K 4.1 02/23/2023 02:36 AM     02/23/2023 02:36 AM    CO2 23 02/23/2023 02:36 AM    ANIONGAP 12 02/23/2023 02:36 AM    BUN 13 02/23/2023 02:36 AM    CREATININE 1.02 02/23/2023 02:36 AM    BUNCRER NOT REPORTED 06/07/2018 01:18 PM    CALCIUM 9.1 02/23/2023 02:36 AM    LABGLOM >60 02/23/2023 02:36 AM    GFRAA >60 06/07/2018 01:18 PM    GFR      06/07/2018 01:18 PM    GFR NOT REPORTED 06/07/2018 01:18 PM     HFP:    Lab Results   Component Value Date/Time    PROT 7.5 02/22/2023 06:37 PM     CMP:    Lab Results   Component Value Date/Time    GLUCOSE 144 02/23/2023 02:36 AM     02/23/2023 02:36 AM    K 4.1 02/23/2023 02:36 AM     02/23/2023 02:36 AM    CO2 23 02/23/2023 02:36 AM    BUN 13 02/23/2023 02:36 AM    CREATININE 1.02 02/23/2023 02:36 AM    ANIONGAP 12 02/23/2023 02:36 AM    ALKPHOS 88 02/22/2023 06:37 PM    ALT 10 02/22/2023 06:37 PM    AST 15 02/22/2023 06:37 PM    BILITOT 0.9 02/22/2023 06:37 PM    LABALBU 4.4 02/22/2023 06:37 PM    ALBUMIN 1.4 02/22/2023 06:37 PM    LABGLOM >60 02/23/2023 02:36 AM    GFRAA >60 06/07/2018 01:18 PM    GFR      06/07/2018 01:18 PM    GFR NOT REPORTED 06/07/2018 01:18 PM    PROT 7.5 02/22/2023 06:37 PM    CALCIUM 9.1 02/23/2023 02:36 AM PT/INR:  No results found for: PTINR, PROTIME, INR  PTT: No results found for: APTT  FLP:    Lab Results   Component Value Date/Time    HDL 47 06/07/2018 01:18 PM     U/A:    Lab Results   Component Value Date/Time    COLORU Yellow 02/23/2023 06:57 AM    TURBIDITY Clear 02/23/2023 06:57 AM    SPECGRAV 1.074 02/23/2023 06:57 AM    HGBUR TRACE 02/23/2023 06:57 AM    PHUR 5.5 02/23/2023 06:57 AM    PROTEINU 1+ 02/23/2023 06:57 AM    GLUCOSEU NEGATIVE 02/23/2023 06:57 AM    KETUA SMALL 02/23/2023 06:57 AM    BILIRUBINUR NEGATIVE 02/23/2023 06:57 AM    UROBILINOGEN Normal 02/23/2023 06:57 AM    NITRU NEGATIVE 02/23/2023 06:57 AM    LEUKOCYTESUR NEGATIVE 02/23/2023 06:57 AM     TSH:    Lab Results   Component Value Date/Time    TSH 1.27 02/23/2023 02:36 AM        Radiology:  XR CHEST (2 VW)    Result Date: 2/22/2023  No acute process. XR CHEST PORTABLE    Result Date: 2/23/2023  No acute findings in the chest.     CT CHEST PULMONARY EMBOLISM W CONTRAST    Result Date: 2/22/2023  1. No evidence of pulmonary embolism or acute pulmonary abnormality. 2.  Emphysema. 3.  Diverticulosis. 4.  Calcified coronary atheromatous plaque. NM Cardiac Stress Test Nuclear Imaging    Result Date: 2/23/2023  Diaphragmatic attenuation artifact in the inferior wall. No stress-induced ischemia Normal LVEF. Risk stratification: Low       Consultations:    Consults:     Final Specialist Recommendations/Findings:   IP CONSULT TO HOSPITALIST      The patient was seen and examined on day of discharge and this discharge summary is in conjunction with any daily progress note from day of discharge.     Discharge plan:     Disposition: Home    Physician Follow Up:     ANGELICA [address]  [phone]  Follow up in 1 week(s)         Requiring Further Evaluation/Follow Up POST HOSPITALIZATION/Incidental Findings:     Diet: regular diet    Activity: As tolerated    Instructions to Patient: establish PCP, tobacco cessation     Discharge Medications:      Medication List        START taking these medications      albuterol sulfate  (90 Base) MCG/ACT inhaler  Commonly known as: Ventolin HFA  Inhale 2 puffs into the lungs 4 times daily as needed for Wheezing     predniSONE 20 MG tablet  Commonly known as: DELTASONE  Take 2 tablets by mouth daily for 3 doses  Start taking on: February 25, 2023            CONTINUE taking these medications      aspirin 81 MG EC tablet               Where to Get Your Medications        These medications were sent to [address] [address]      Phone: [phone]   albuterol sulfate  (90 Base) MCG/ACT inhaler  predniSONE 20 MG tablet         No discharge procedures on file. Time Spent on discharge is  32 mins in patient examination, evaluation, counseling as well as medication reconciliation, prescriptions for required medications, discharge plan and follow up.     Electronically signed by   Srinivas Easley MD  2/24/2023  1:28 PM

## 2023-02-27 ENCOUNTER — OFFICE VISIT (OUTPATIENT)
Dept: FAMILY MEDICINE CLINIC | Age: 68
End: 2023-02-27
Payer: MEDICARE

## 2023-02-27 VITALS
WEIGHT: 165.6 LBS | DIASTOLIC BLOOD PRESSURE: 64 MMHG | HEIGHT: 72 IN | TEMPERATURE: 97.7 F | SYSTOLIC BLOOD PRESSURE: 130 MMHG | HEART RATE: 87 BPM | BODY MASS INDEX: 22.43 KG/M2

## 2023-02-27 DIAGNOSIS — Z87.891 PERSONAL HISTORY OF TOBACCO USE: ICD-10-CM

## 2023-02-27 DIAGNOSIS — Z00.00 ENCOUNTER FOR MEDICAL EXAMINATION TO ESTABLISH CARE: ICD-10-CM

## 2023-02-27 DIAGNOSIS — Z12.11 COLON CANCER SCREENING: ICD-10-CM

## 2023-02-27 DIAGNOSIS — J44.9 CHRONIC OBSTRUCTIVE PULMONARY DISEASE, UNSPECIFIED COPD TYPE (HCC): Primary | ICD-10-CM

## 2023-02-27 DIAGNOSIS — Z09 HOSPITAL DISCHARGE FOLLOW-UP: ICD-10-CM

## 2023-02-27 PROCEDURE — 99211 OFF/OP EST MAY X REQ PHY/QHP: CPT | Performed by: STUDENT IN AN ORGANIZED HEALTH CARE EDUCATION/TRAINING PROGRAM

## 2023-02-27 PROCEDURE — G0296 VISIT TO DETERM LDCT ELIG: HCPCS | Performed by: STUDENT IN AN ORGANIZED HEALTH CARE EDUCATION/TRAINING PROGRAM

## 2023-02-27 RX ORDER — DOXYCYCLINE HYCLATE 100 MG/1
100 CAPSULE ORAL 2 TIMES DAILY
COMMUNITY
Start: 2019-06-13

## 2023-02-27 RX ORDER — BUDESONIDE AND FORMOTEROL FUMARATE DIHYDRATE 80; 4.5 UG/1; UG/1
2 AEROSOL RESPIRATORY (INHALATION) 2 TIMES DAILY
Qty: 10.2 G | Refills: 3 | Status: CANCELLED | OUTPATIENT
Start: 2023-02-27

## 2023-02-27 RX ORDER — VARENICLINE TARTRATE 0.5 MG/1
.5-1 TABLET, FILM COATED ORAL SEE ADMIN INSTRUCTIONS
Qty: 57 TABLET | Refills: 0 | Status: SHIPPED | OUTPATIENT
Start: 2023-02-27

## 2023-02-27 RX ORDER — BUDESONIDE AND FORMOTEROL FUMARATE DIHYDRATE 160; 4.5 UG/1; UG/1
2 AEROSOL RESPIRATORY (INHALATION) 2 TIMES DAILY
Qty: 10.2 G | Refills: 3 | Status: SHIPPED | OUTPATIENT
Start: 2023-02-27

## 2023-02-27 SDOH — ECONOMIC STABILITY: FOOD INSECURITY: WITHIN THE PAST 12 MONTHS, YOU WORRIED THAT YOUR FOOD WOULD RUN OUT BEFORE YOU GOT MONEY TO BUY MORE.: NEVER TRUE

## 2023-02-27 SDOH — ECONOMIC STABILITY: HOUSING INSECURITY
IN THE LAST 12 MONTHS, WAS THERE A TIME WHEN YOU DID NOT HAVE A STEADY PLACE TO SLEEP OR SLEPT IN A SHELTER (INCLUDING NOW)?: NO

## 2023-02-27 SDOH — ECONOMIC STABILITY: INCOME INSECURITY: HOW HARD IS IT FOR YOU TO PAY FOR THE VERY BASICS LIKE FOOD, HOUSING, MEDICAL CARE, AND HEATING?: HARD

## 2023-02-27 SDOH — ECONOMIC STABILITY: FOOD INSECURITY: WITHIN THE PAST 12 MONTHS, THE FOOD YOU BOUGHT JUST DIDN'T LAST AND YOU DIDN'T HAVE MONEY TO GET MORE.: NEVER TRUE

## 2023-02-27 ASSESSMENT — ENCOUNTER SYMPTOMS
COLOR CHANGE: 0
CONSTIPATION: 0
BACK PAIN: 0
ABDOMINAL DISTENTION: 0
DIARRHEA: 0
NAUSEA: 0
WHEEZING: 0
VOMITING: 0
PHOTOPHOBIA: 0
APNEA: 0
SHORTNESS OF BREATH: 0
COUGH: 0
ABDOMINAL PAIN: 0

## 2023-02-27 ASSESSMENT — PATIENT HEALTH QUESTIONNAIRE - PHQ9
1. LITTLE INTEREST OR PLEASURE IN DOING THINGS: 0
SUM OF ALL RESPONSES TO PHQ QUESTIONS 1-9: 1
SUM OF ALL RESPONSES TO PHQ QUESTIONS 1-9: 1
2. FEELING DOWN, DEPRESSED OR HOPELESS: 1
SUM OF ALL RESPONSES TO PHQ QUESTIONS 1-9: 1
SUM OF ALL RESPONSES TO PHQ9 QUESTIONS 1 & 2: 1
SUM OF ALL RESPONSES TO PHQ QUESTIONS 1-9: 1

## 2023-02-27 NOTE — PATIENT INSTRUCTIONS

## 2023-02-27 NOTE — PROGRESS NOTES
Subjective:    Sam Ramirez is a 78 y.o. male with  has no past medical history on file. No family history on file. Presented tothe office today for:  Chief Complaint   Patient presents with    COPD     New patient, [facility] care     Health Maintenance     Declined vaccines. Cologuard pended,        HPI    Sam Ramirez is a 71-year-old male with a PMH significant for COPD. Patient is here today to establish care with this office. Per chart review, it appears patient was hospitalized at [address] [place] [address] from 2023 to 2023 for COPD exacerbation. Reportedly, patient also underwent a stress test for chest pain which was negative. Patient was discharged on albuterol inhaler and prednisone. Patient reports that his breathing has improved since discharge. He still reports occasional dyspnea on exertion. However, denies any cough or sputum production. Denies any fever, chills, headaches, dizziness, chest pain, worsening SOB, palpitation, abdominal pain, nausea, vomiting, or bloody stools        Occupation: foremen   Previous PCP: unknown    PMHx: COPD,     FMHx:   -Mother: ;    -Father: ; pancreatic cancer   -Siblings:    Social Hx:   -Drink: social drinker   -Smoke: 1 PPD x 50 years   -Drugs: Allergies: NKDA  Medications: none    Review of Systems   Constitutional:  Negative for activity change, appetite change and fatigue. HENT:  Negative for congestion. Eyes:  Negative for photophobia and visual disturbance. Respiratory:  Negative for apnea, cough, shortness of breath and wheezing. Cardiovascular:  Negative for chest pain, palpitations and leg swelling. Gastrointestinal:  Negative for abdominal distention, abdominal pain, constipation, diarrhea, nausea and vomiting. Endocrine: Negative for polyuria. Genitourinary:  Negative for difficulty urinating and urgency. Musculoskeletal:  Negative for arthralgias and back pain. Skin:  Negative for color change. Neurological:  Negative for dizziness, syncope and headaches. Psychiatric/Behavioral:  Negative for agitation, behavioral problems, confusion, decreased concentration and dysphoric mood. Objective:    BP (!) 150/77 (Site: Left Upper Arm, Position: Sitting, Cuff Size: Medium Adult)   Pulse 87   Temp 97.7 °F (36.5 °C) (Temporal)   Ht 6' (1.829 m)   Wt 165 lb 9.6 oz (75.1 kg)   BMI 22.46 kg/m²    BP Readings from Last 3 Encounters:   02/27/23 (!) 150/77   02/24/23 135/69     Physical Exam  Constitutional:       General: He is not in acute distress. Appearance: Normal appearance. He is not ill-appearing. HENT:      Head: Normocephalic and atraumatic. Eyes:      Extraocular Movements: Extraocular movements intact. Cardiovascular:      Rate and Rhythm: Normal rate and regular rhythm. Pulses: Normal pulses. Pulmonary:      Effort: Pulmonary effort is normal. No respiratory distress. Breath sounds: No wheezing, rhonchi or rales. Abdominal:      General: Bowel sounds are normal. There is no distension. Palpations: Abdomen is soft. Tenderness: There is no abdominal tenderness. There is no guarding. Skin:     General: Skin is warm. Neurological:      General: No focal deficit present. Mental Status: He is alert and oriented to person, place, and time. Psychiatric:         Mood and Affect: Mood normal.         Lab Results   Component Value Date    WBC 9.5 02/22/2023    HGB 15.5 02/22/2023    HCT 43.5 02/22/2023     02/22/2023    HDL 47 06/07/2018    ALT 10 02/22/2023    AST 15 02/22/2023     02/23/2023    K 4.1 02/23/2023     02/23/2023    CREATININE 1.02 02/23/2023    BUN 13 02/23/2023    CO2 23 02/23/2023    TSH 1.27 02/23/2023    PSA 1.04 06/07/2018    GLUF 87 06/07/2018     Lab Results   Component Value Date    CALCIUM 9.1 02/23/2023     Lab Results   Component Value Date    LDLCHOLESTEROL 120 06/07/2018       Assessment and Plan:    1.  [facility] discharge follow-up  -Reviewed all labs and imaging with patient    2. Chronic obstructive pulmonary disease, unspecified COPD type ([facility] [address]  -Patient needs PFT test  -Started patient on Symbicort 2 puffs into the lungs 2 times daily as maintenance therapy and will need albuterol inhaler as needed  -Benefits, risk, and alternatives discussed with patient  - Full PFT Study With Bronchodilator; Future  - budesonide-formoterol (SYMBICORT) 160-4.5 MCG/ACT AERO; Inhale 2 puffs into the lungs 2 times daily  Dispense: 10.2 g; Refill: 3    3. Encounter for medical examination to establish care      4. Colon cancer screening    - Fecal DNA Colorectal cancer screening (Cologuard)    5. Personal history of tobacco use  -1 PPD for the past 50 years  -We will start patient on Chantix for smoking cessation  -Benefits, risk, and alternatives discussed with patient  -Patient voiced understanding and is in agreement  - [place] VISIT TO DISCUSS LUNG CA SCREEN W LDCT  - CT Lung Screen (Annual/Baseline); Future  - varenicline (CHANTIX) 0.5 MG tablet; Take 1-2 tablets by mouth See Admin Instructions 0.5mg DAILY for 3 days followed by 0.5mg TWICE DAILY for 4 days followed by 1mg TWICE DAILY  Dispense: 57 tablet; Refill: 0        Requested Prescriptions     Signed Prescriptions Disp Refills    budesonide-formoterol (SYMBICORT) 160-4.5 MCG/ACT AERO 10.2 g 3     Sig: Inhale 2 puffs into the lungs 2 times daily    varenicline (CHANTIX) 0.5 MG tablet 57 tablet 0     Sig: Take 1-2 tablets by mouth See Admin Instructions 0.5mg DAILY for 3 days followed by 0.5mg TWICE DAILY for 4 days followed by 1mg TWICE DAILY       There are no discontinued medications. Return in about 2 months (around 4/27/2023) for AWV. Nicci Hebert received counseling on the following healthy behaviors:   Reviewed prior labs and health maintenance  Continue current medications, diet and exercise. Discussed use, benefit, and side effects of prescribed medications.   Barriers to medication compliance addressed. Patient given educational materials - see patient instructions  Was a self-tracking handout given in paper form or via [employer]? No:     Requested Prescriptions     Signed Prescriptions Disp Refills    budesonide-formoterol (SYMBICORT) 160-4.5 MCG/ACT AERO 10.2 g 3     Sig: Inhale 2 puffs into the lungs 2 times daily    varenicline (CHANTIX) 0.5 MG tablet 57 tablet 0     Sig: Take 1-2 tablets by mouth See Admin Instructions 0.5mg DAILY for 3 days followed by 0.5mg TWICE DAILY for 4 days followed by 1mg TWICE DAILY       All patient questions answered. Patient voiced understanding. Quality Measures    Body mass index is 22.46 kg/m². Normal. Weight control planned discussed Healthy diet and regular exercise. BP: (!) 150/77. Blood pressure is normal. Treatment plan consists of No treatment change needed. Fall Risk 2/27/2023   2 or more falls in past year? no   Fall with injury in past year? no         Lab Results   Component Value Date    LDLCHOLESTEROL 120 06/07/2018    (goal LDL reduction with dx if diabetes is 50% LDL reduction)    PHQ Scores 2/27/2023   PHQ2 Score 1   PHQ9 Score 1     Interpretation of Total Score Depression Severity: 1-4 = Minimal depression, 5-9 = Mild depression, 10-14 = Moderate depression, 15-19 = Moderately severe depression, 20-27 = Severe depression       Discussed with the patient the current USPSTF guidelines released March 9, 2021 for screening for lung cancer. For adults aged 48 to [de-identified] years who have a 20 pack-year smoking history and currently smoke or have quit within the past 15 years the grade B recommendation is to:  Screen for lung cancer with low-dose computed tomography (LDCT) every year. Stop screening once a person has not smoked for 15 years or has a health problem that limits life expectancy or the ability to have lung surgery. The patient  reports that he has been smoking cigarettes. He started smoking about 52 years ago.  He has a 25.00 pack-year smoking history. He has never used smokeless tobacco.. Discussed with patient the risks and benefits of screening, including over-diagnosis, false positive rate, and total radiation exposure. The patient currently exhibits no signs or symptoms suggestive of lung cancer. Discussed with patient the importance of compliance with yearly annual lung cancer screenings and willingness to undergo diagnosis and treatment if screening scan is positive. In addition, the patient was counseled regarding the importance of remaining smoke free and/or total smoking cessation.     Also reviewed the following if the patient has Medicare that as of February 10, 2022, Medicare only covers LDCT screening in patients aged 51-72 with at least a 20 pack-year smoking history who currently smoke or have quit in the last 15 years

## 2023-02-27 NOTE — PROGRESS NOTES
Visit Information    Have you changed or started any medications since your last visit including any over-the-counter medicines, vitamins, or herbal medicines? no   Have you stopped taking any of your medications? Is so, why? -  no  Are you having any side effects from any of your medications? - no    Have you seen any other physician or provider since your last visit?  no   Have you had any other diagnostic tests since your last visit?  no   Have you been seen in the emergency room and/or had an admission in a hospital since we last saw you?  yes - 2-22-23 to 2-24-23 [facility]    Have you had your routine dental cleaning in the past 6 months?  no     Do you have an active MyChart account? If no, what is the barrier?   Yes    Patient Care Team:  Neo Jeronimo MD as PCP - General (Emergency Medicine)    Medical History Review  Past Medical, Family, and Social History reviewed and does not contribute to the patient presenting condition    Health Maintenance   Topic Date Due    Pneumococcal 65+ years Vaccine (1 - PCV) Never done    Depression Screen  Never done    DTaP/Tdap/Td vaccine (1 - Tdap) Never done    Colorectal Cancer Screen  Never done    Shingles vaccine (1 of 2) Never done    Low dose CT lung screening  Never done    AAA screen  10/26/2021    COVID-19 Vaccine (4 - Booster for Pfizer series) 01/14/2022    Flu vaccine (1) Never done    Annual Wellness Visit (AWV)  02/23/2023    Lipids  06/07/2023    Hepatitis C screen  Completed    Hepatitis A vaccine  Aged Out    Hib vaccine  Aged Out    Meningococcal (ACWY) vaccine  Aged Out

## 2023-02-27 NOTE — PROGRESS NOTES
Attending Physician Statement  I have discussed the care of Alicia Andre 78 y.o. male, including pertinent history and exam findings, with the resident Dr. Mahad Vallejo MD.    History and Exam:   Chief Complaint   Patient presents with    COPD     New patient, [facility] care     Health Maintenance     Declined vaccines. Cologuard pended,        No past medical history on file. No Known Allergies   I have seen and examined the patient and the key elements of the encounter have been performed by me. BP Readings from Last 3 Encounters:   02/27/23 130/64   02/24/23 135/69     /64 (Site: Left Upper Arm, Position: Sitting, Cuff Size: Medium Adult)   Pulse 87   Temp 97.7 °F (36.5 °C) (Temporal)   Ht 6' (1.829 m)   Wt 165 lb 9.6 oz (75.1 kg)   BMI 22.46 kg/m²   Lab Results   Component Value Date    WBC 9.5 02/22/2023    HGB 15.5 02/22/2023    HCT 43.5 02/22/2023     02/22/2023    HDL 47 06/07/2018    ALT 10 02/22/2023    AST 15 02/22/2023     02/23/2023    K 4.1 02/23/2023     02/23/2023    CREATININE 1.02 02/23/2023    BUN 13 02/23/2023    CO2 23 02/23/2023    TSH 1.27 02/23/2023    PSA 1.04 06/07/2018    GLUF 87 06/07/2018     Lab Results   Component Value Date    LABALBU 4.4 02/22/2023     No results found for: IRON, TIBC, FERRITIN  Lab Results   Component Value Date    LDLCHOLESTEROL 120 06/07/2018     I agree with the assessment, plan and the diagnosis of    Diagnosis Orders   1. Chronic obstructive pulmonary disease, unspecified COPD type ([facility] [address]  Full PFT Study With Bronchodilator    budesonide-formoterol (SYMBICORT) 160-4.5 MCG/ACT AERO      2. Hospital discharge follow-up        3. Encounter for medical examination to establish care        4. Colon cancer screening  Fecal DNA Colorectal cancer screening (Cologuard)      5. Personal history of tobacco use  [place] VISIT TO DISCUSS LUNG CA SCREEN W LDCT    CT Lung Screen (Annual/Baseline)    varenicline (CHANTIX) 0.5 MG tablet       .  I agree with orders as documented by the resident. More than 25 minutes spent  in face to face encounter with the patient and more than half in counseling. Patient's questions were answered. Patient Voiced understanding to the counseling. Return in about 2 months (around 4/27/2023) for AWV.    (GC Modifier)-Dr. Tray Plascencia MD

## 2023-03-24 ENCOUNTER — HOSPITAL ENCOUNTER (OUTPATIENT)
Dept: PULMONOLOGY | Age: 68
Discharge: HOME | End: 2023-03-24

## 2023-03-24 DIAGNOSIS — J44.9 CHRONIC OBSTRUCTIVE PULMONARY DISEASE, UNSPECIFIED COPD TYPE (HCC): ICD-10-CM

## 2023-04-19 ENCOUNTER — HOSPITAL ENCOUNTER (OUTPATIENT)
Dept: PULMONOLOGY | Age: 68
Discharge: HOME | End: 2023-04-19
Payer: MEDICARE

## 2023-04-19 PROCEDURE — 94640 AIRWAY INHALATION TREATMENT: CPT

## 2023-04-19 PROCEDURE — 94060 EVALUATION OF WHEEZING: CPT

## 2023-04-19 PROCEDURE — 94664 DEMO&/EVAL PT USE INHALER: CPT

## 2023-04-19 PROCEDURE — 94726 PLETHYSMOGRAPHY LUNG VOLUMES: CPT

## 2023-04-19 PROCEDURE — 94729 DIFFUSING CAPACITY: CPT

## 2023-04-28 ENCOUNTER — TELEPHONE (OUTPATIENT)
Dept: FAMILY MEDICINE CLINIC | Age: 68
End: 2023-04-28

## 2023-04-28 NOTE — TELEPHONE ENCOUNTER
----- Message from Ozzie Rutledge MD sent at 4/28/2023 12:35 PM EDT -----  Please let patient know that I reviewed his PFT studies and that he has stage I COPD. Please confirm with him that he has an appointment with me on 5/4/2023 and during that time I will adjust his inhalers.  thanks

## 2023-04-28 NOTE — TELEPHONE ENCOUNTER
Pt was informed of test results, and confirmed will be here on 5-4-23  for appt. Pt voiced understanding that adjustment for his inhalers will be discussed at appt per provider.

## 2023-05-04 ENCOUNTER — OFFICE VISIT (OUTPATIENT)
Dept: FAMILY MEDICINE CLINIC | Age: 68
End: 2023-05-04
Payer: MEDICARE

## 2023-05-04 VITALS
SYSTOLIC BLOOD PRESSURE: 130 MMHG | HEIGHT: 72 IN | BODY MASS INDEX: 21.94 KG/M2 | HEART RATE: 108 BPM | DIASTOLIC BLOOD PRESSURE: 80 MMHG | WEIGHT: 162 LBS

## 2023-05-04 DIAGNOSIS — Z23 NEED FOR TDAP VACCINATION: ICD-10-CM

## 2023-05-04 DIAGNOSIS — Z23 NEED FOR PROPHYLACTIC VACCINATION AGAINST STREPTOCOCCUS PNEUMONIAE (PNEUMOCOCCUS): ICD-10-CM

## 2023-05-04 DIAGNOSIS — Z00.00 INITIAL MEDICARE ANNUAL WELLNESS VISIT: Primary | ICD-10-CM

## 2023-05-04 DIAGNOSIS — Z23 NEED FOR TETANUS, DIPHTHERIA, AND ACELLULAR PERTUSSIS (TDAP) VACCINE: ICD-10-CM

## 2023-05-04 DIAGNOSIS — J44.9 STAGE 1 MILD CHRONIC OBSTRUCTIVE PULMONARY DISEASE BY GLOBAL INITIATIVE FOR CHRONIC OBSTRUCTIVE LUNG DISEASE CLASSIFICATION (HCC): ICD-10-CM

## 2023-05-04 DIAGNOSIS — Z23 NEED FOR PNEUMOCOCCAL VACCINATION: ICD-10-CM

## 2023-05-04 DIAGNOSIS — Z23 NEED FOR ZOSTER VACCINATION: ICD-10-CM

## 2023-05-04 DIAGNOSIS — Z87.891 PERSONAL HISTORY OF TOBACCO USE, PRESENTING HAZARDS TO HEALTH: ICD-10-CM

## 2023-05-04 PROCEDURE — 99406 BEHAV CHNG SMOKING 3-10 MIN: CPT | Performed by: STUDENT IN AN ORGANIZED HEALTH CARE EDUCATION/TRAINING PROGRAM

## 2023-05-04 PROCEDURE — 3017F COLORECTAL CA SCREEN DOC REV: CPT | Performed by: STUDENT IN AN ORGANIZED HEALTH CARE EDUCATION/TRAINING PROGRAM

## 2023-05-04 PROCEDURE — 90732 PPSV23 VACC 2 YRS+ SUBQ/IM: CPT | Performed by: STUDENT IN AN ORGANIZED HEALTH CARE EDUCATION/TRAINING PROGRAM

## 2023-05-04 PROCEDURE — 1123F ACP DISCUSS/DSCN MKR DOCD: CPT | Performed by: STUDENT IN AN ORGANIZED HEALTH CARE EDUCATION/TRAINING PROGRAM

## 2023-05-04 PROCEDURE — G0438 PPPS, INITIAL VISIT: HCPCS | Performed by: STUDENT IN AN ORGANIZED HEALTH CARE EDUCATION/TRAINING PROGRAM

## 2023-05-04 PROCEDURE — 90471 IMMUNIZATION ADMIN: CPT | Performed by: STUDENT IN AN ORGANIZED HEALTH CARE EDUCATION/TRAINING PROGRAM

## 2023-05-04 RX ORDER — UMECLIDINIUM 62.5 UG/1
1 AEROSOL, POWDER ORAL DAILY
Qty: 60 EACH | Refills: 1 | Status: SHIPPED | OUTPATIENT
Start: 2023-05-04

## 2023-05-04 ASSESSMENT — PATIENT HEALTH QUESTIONNAIRE - PHQ9
2. FEELING DOWN, DEPRESSED OR HOPELESS: 0
SUM OF ALL RESPONSES TO PHQ9 QUESTIONS 1 & 2: 0
SUM OF ALL RESPONSES TO PHQ QUESTIONS 1-9: 0
1. LITTLE INTEREST OR PLEASURE IN DOING THINGS: 0

## 2023-05-04 ASSESSMENT — LIFESTYLE VARIABLES
HOW MANY STANDARD DRINKS CONTAINING ALCOHOL DO YOU HAVE ON A TYPICAL DAY: 1 OR 2
HOW OFTEN DO YOU HAVE A DRINK CONTAINING ALCOHOL: MONTHLY OR LESS

## 2023-05-08 NOTE — PROGRESS NOTES
Attending Physician Statement  I  have discussed the care of Jana Mccloud including pertinent history and exam findings with the resident. I agree with the assessment, plan and orders as documented by the resident. /80 (Site: Right Upper Arm, Position: Sitting, Cuff Size: Large Adult)   Pulse (!) 108   Ht 6' (1.829 m)   Wt 162 lb (73.5 kg)   BMI 21.97 kg/m²    BP Readings from Last 3 Encounters:   05/04/23 130/80   02/27/23 130/64   02/24/23 135/69     Wt Readings from Last 3 Encounters:   05/04/23 162 lb (73.5 kg)   02/27/23 165 lb 9.6 oz (75.1 kg)   02/24/23 168 lb 10.4 oz (76.5 kg)          Diagnosis Orders   1. Initial Medicare annual wellness visit        2. Need for zoster vaccination  zoster recombinant adjuvanted vaccine [facility]) 50 MCG/0.5ML SUSR injection      3. Stage 1 mild chronic obstructive pulmonary disease by Global Initiative for Chronic Obstructive Lung Disease classification (HCC)  umeclidinium bromide (INCRUSE ELLIPTA) 62.5 MCG/ACT inhaler      4. Need for prophylactic vaccination against Streptococcus pneumoniae (pneumococcus)  Pneumococcal polysaccharide vaccine 23-valent PPSV23      5. Need for tetanus, diphtheria, and acellular pertussis (Tdap) vaccine  Tdap, BOOSTRIX, (age 8 yrs+), IM      6. Personal history of tobacco use, presenting hazards to health  AL Smoking and Tobacco Use Cessation (Intermediate): 3-10 MINUTES [80003]      7. Need for pneumococcal vaccination        8.  Need for Tdap vaccination            Gracia Caruso MD 5/8/2023 10:23 AM

## 2023-05-11 ENCOUNTER — OFFICE VISIT (OUTPATIENT)
Dept: FAMILY MEDICINE CLINIC | Age: 68
End: 2023-05-11
Payer: MEDICARE

## 2023-05-11 VITALS
WEIGHT: 162 LBS | DIASTOLIC BLOOD PRESSURE: 76 MMHG | SYSTOLIC BLOOD PRESSURE: 137 MMHG | HEIGHT: 72 IN | BODY MASS INDEX: 21.94 KG/M2 | HEART RATE: 87 BPM

## 2023-05-11 DIAGNOSIS — K40.90 RIGHT INGUINAL HERNIA: Primary | ICD-10-CM

## 2023-05-11 DIAGNOSIS — Z13.6 SCREENING FOR AAA (ABDOMINAL AORTIC ANEURYSM): ICD-10-CM

## 2023-05-11 PROCEDURE — 4004F PT TOBACCO SCREEN RCVD TLK: CPT | Performed by: STUDENT IN AN ORGANIZED HEALTH CARE EDUCATION/TRAINING PROGRAM

## 2023-05-11 PROCEDURE — 3017F COLORECTAL CA SCREEN DOC REV: CPT | Performed by: STUDENT IN AN ORGANIZED HEALTH CARE EDUCATION/TRAINING PROGRAM

## 2023-05-11 PROCEDURE — 99213 OFFICE O/P EST LOW 20 MIN: CPT | Performed by: STUDENT IN AN ORGANIZED HEALTH CARE EDUCATION/TRAINING PROGRAM

## 2023-05-11 PROCEDURE — G8420 CALC BMI NORM PARAMETERS: HCPCS | Performed by: STUDENT IN AN ORGANIZED HEALTH CARE EDUCATION/TRAINING PROGRAM

## 2023-05-11 PROCEDURE — 1123F ACP DISCUSS/DSCN MKR DOCD: CPT | Performed by: STUDENT IN AN ORGANIZED HEALTH CARE EDUCATION/TRAINING PROGRAM

## 2023-05-11 PROCEDURE — G8427 DOCREV CUR MEDS BY ELIG CLIN: HCPCS | Performed by: STUDENT IN AN ORGANIZED HEALTH CARE EDUCATION/TRAINING PROGRAM

## 2023-05-11 ASSESSMENT — ENCOUNTER SYMPTOMS
DIARRHEA: 0
COLOR CHANGE: 0
COUGH: 0
ABDOMINAL DISTENTION: 0
ABDOMINAL PAIN: 0
SHORTNESS OF BREATH: 0
CONSTIPATION: 0
PHOTOPHOBIA: 0
APNEA: 0
VOMITING: 0
WHEEZING: 0
NAUSEA: 0
BACK PAIN: 0

## 2023-05-11 NOTE — PROGRESS NOTES
Attending Physician Statement  I have discussed the care of Abdulazizluding pertinent history and exam findings,  with the resident. I have reviewed the key elements of all parts of the encounter with the resident. I agree with the assessment, plan and orders as documented by the resident.   (GE Modifier)    R inguinal hernia- Surgery referral  HM- AAA screening US
Visit Information    Have you changed or started any medications since your last visit including any over-the-counter medicines, vitamins, or herbal medicines? no   Have you stopped taking any of your medications? Is so, why? -  no  Are you having any side effects from any of your medications? - no    Have you seen any other physician or provider since your last visit?  no   Have you had any other diagnostic tests since your last visit?  no   Have you been seen in the emergency room and/or had an admission in a hospital since we last saw you?  no   Have you had your routine dental cleaning in the past 6 months?  no     Do you have an active MyChart account? If no, what is the barrier?   Yes    Patient Care Team:  Bobette Kanner, MD as PCP - General (Family Medicine)    Medical History Review  Past Medical, Family, and Social History reviewed and does contribute to the patient presenting condition    Health Maintenance   Topic Date Due    Colorectal Cancer Screen  Never done    Shingles vaccine (1 of 2) Never done    Low dose CT lung screening  Never done    AAA screen  Never done    COVID-19 Vaccine (4 - Booster for [employer] series) 01/14/2022    Lipids  06/07/2023    Flu vaccine (Season Ended) 08/01/2023    Depression Screen  05/04/2024    Annual Wellness Visit (AWV)  05/04/2024    DTaP/Tdap/Td vaccine (2 - Td or Tdap) 05/04/2033    Pneumococcal 65+ years Vaccine  Completed    Hepatitis C screen  Completed    Hepatitis A vaccine  Aged Out    Hib vaccine  Aged Out    Meningococcal (ACWY) vaccine  Aged Out    Diabetes screen  Discontinued
understanding and is agreement  - Trish Mistry MD, General Surgery, [place]    2. Screening for AAA (abdominal aortic aneurysm)    - [place] SCREENING FOR AAA; Future          Requested Prescriptions      No prescriptions requested or ordered in this encounter       Medications Discontinued During This Encounter   Medication Reason    zoster recombinant adjuvanted vaccine [facility]) 50 MCG/0.5ML SUSR injection LIST CLEANUP       Return in about 6 weeks (around 6/22/2023). Allie Martinez received counseling on the following healthy behaviors:   Reviewed prior labs and health maintenance  Continue current medications, diet and exercise. Discussed use, benefit, and side effects of prescribed medications. Barriers to medication compliance addressed. Patient given educational materials - see patient instructions  Was a self-tracking handout given in paper form or via [employer]? No:     Requested Prescriptions      No prescriptions requested or ordered in this encounter       All patient questions answered. Patient voiced understanding. Quality Measures    Body mass index is 21.97 kg/m². Normal. Weight control planned discussed Healthy diet and regular exercise. BP: 137/76. Blood pressure is normal. Treatment plan consists of No treatment change needed.     Fall Risk 5/4/2023 2/27/2023   2 or more falls in past year? no no   Fall with injury in past year? no no         Lab Results   Component Value Date    LDLCHOLESTEROL 120 06/07/2018    (goal LDL reduction with dx if diabetes is 50% LDL reduction)    PHQ Scores 5/4/2023 2/27/2023   PHQ2 Score 0 1   PHQ9 Score 0 1     Interpretation of Total Score Depression Severity: 1-4 = Minimal depression, 5-9 = Mild depression, 10-14 = Moderate depression, 15-19 = Moderately severe depression, 20-27 = Severe depression

## 2023-07-07 ENCOUNTER — OFFICE VISIT (OUTPATIENT)
Dept: FAMILY MEDICINE CLINIC | Age: 68
End: 2023-07-07
Payer: MEDICARE

## 2023-07-07 VITALS
SYSTOLIC BLOOD PRESSURE: 120 MMHG | WEIGHT: 156 LBS | DIASTOLIC BLOOD PRESSURE: 71 MMHG | BODY MASS INDEX: 21.16 KG/M2 | HEART RATE: 84 BPM

## 2023-07-07 DIAGNOSIS — F17.200 SMOKER: ICD-10-CM

## 2023-07-07 DIAGNOSIS — E78.5 DYSLIPIDEMIA: ICD-10-CM

## 2023-07-07 DIAGNOSIS — Z98.890 STATUS POST HERNIA REPAIR: ICD-10-CM

## 2023-07-07 DIAGNOSIS — J44.9 CHRONIC OBSTRUCTIVE PULMONARY DISEASE, UNSPECIFIED COPD TYPE (HCC): Primary | ICD-10-CM

## 2023-07-07 DIAGNOSIS — Z13.220 NEED FOR LIPID SCREENING: ICD-10-CM

## 2023-07-07 DIAGNOSIS — Z87.19 STATUS POST HERNIA REPAIR: ICD-10-CM

## 2023-07-07 PROCEDURE — 1123F ACP DISCUSS/DSCN MKR DOCD: CPT

## 2023-07-07 PROCEDURE — 99213 OFFICE O/P EST LOW 20 MIN: CPT

## 2023-07-07 PROCEDURE — G8427 DOCREV CUR MEDS BY ELIG CLIN: HCPCS

## 2023-07-07 PROCEDURE — 3017F COLORECTAL CA SCREEN DOC REV: CPT

## 2023-07-07 PROCEDURE — G8420 CALC BMI NORM PARAMETERS: HCPCS

## 2023-07-07 PROCEDURE — 4004F PT TOBACCO SCREEN RCVD TLK: CPT

## 2023-07-07 PROCEDURE — 3023F SPIROM DOC REV: CPT

## 2023-07-07 ASSESSMENT — ENCOUNTER SYMPTOMS
CONSTIPATION: 0
ABDOMINAL PAIN: 0
CHEST TIGHTNESS: 0
BACK PAIN: 0
DIARRHEA: 0
SHORTNESS OF BREATH: 0
NAUSEA: 0

## 2023-07-07 NOTE — PROGRESS NOTES
Subjective:    Lisbeth Teixeira is a 78 y.o. male with  has a past medical history of Blind left eye, COPD (chronic obstructive pulmonary disease) ([address]), and Inguinal hernia. Presented to the office today for:  Chief Complaint   Patient presents with    Hernia     Had surgery 3 weeks ago - just following up, area is still pretty tender        HPI  Patient presented today for follow-up visit s/p hernia repair. Patient states he followed up last week with surgery, no complications, no abdominal pain or tenderness. Gradually going back to his normal daily activities with limitation on weight lifting. Patient denies any constipation or diarrhea, had no complaints. Discussed with patient the importance of multiple screening that were ordered from previous visit. Including AAA screening, CT chest for lung CA screening and call the rectal cancer screening.,  Patient states he was scheduled for appointments. Smoking cessation discussed with patient, trying to cut down slowly on his own, previous trial of Chantix and nicotine patches that had side effects patient did not want to continue with. History of COPD currently controlled with an Incruse Ellipta inhaler. Rarely uses albuterol Inhaler, denies any shortness of breath, wheezing or cough with increased sputum. Review of Systems   Constitutional:  Negative for chills, fatigue and unexpected weight change. Eyes:  Negative for visual disturbance. Respiratory:  Negative for chest tightness and shortness of breath. Gastrointestinal:  Negative for abdominal pain, constipation, diarrhea and nausea. Genitourinary:  Negative for enuresis and frequency. Musculoskeletal:  Negative for arthralgias and back pain. Neurological:  Negative for dizziness, facial asymmetry and headaches. Psychiatric/Behavioral:  Negative for behavioral problems and sleep disturbance. The patient has a No family history on file.     Objective:    /71

## 2024-07-31 ENCOUNTER — TELEPHONE (OUTPATIENT)
Dept: FAMILY MEDICINE CLINIC | Age: 69
End: 2024-07-31

## 2024-08-02 ENCOUNTER — TELEPHONE (OUTPATIENT)
Dept: FAMILY MEDICINE CLINIC | Age: 69
End: 2024-08-02

## 2024-08-05 ENCOUNTER — TELEPHONE (OUTPATIENT)
Dept: FAMILY MEDICINE CLINIC | Age: 69
End: 2024-08-05

## 2024-08-09 ENCOUNTER — TELEPHONE (OUTPATIENT)
Dept: FAMILY MEDICINE CLINIC | Age: 69
End: 2024-08-09

## 2024-08-14 ENCOUNTER — TELEPHONE (OUTPATIENT)
Dept: FAMILY MEDICINE CLINIC | Age: 69
End: 2024-08-14

## 2024-08-19 ENCOUNTER — TELEPHONE (OUTPATIENT)
Dept: FAMILY MEDICINE CLINIC | Age: 69
End: 2024-08-19

## 2024-08-19 NOTE — TELEPHONE ENCOUNTER
Called patient refused to scheduled. Torri Minaya Lpn   Called patient refused to scheduled. Torri Minaya Lpn

## 2024-09-20 ENCOUNTER — TELEPHONE (OUTPATIENT)
Dept: FAMILY MEDICINE CLINIC | Age: 69
End: 2024-09-20

## 2024-10-04 ENCOUNTER — TELEPHONE (OUTPATIENT)
Dept: OTHER | Facility: CLINIC | Age: 69
End: 2024-10-04